# Patient Record
Sex: MALE | Race: ASIAN | NOT HISPANIC OR LATINO | Employment: OTHER | URBAN - METROPOLITAN AREA
[De-identification: names, ages, dates, MRNs, and addresses within clinical notes are randomized per-mention and may not be internally consistent; named-entity substitution may affect disease eponyms.]

---

## 2023-12-14 ENCOUNTER — TRANSFERRED RECORDS (OUTPATIENT)
Dept: HEALTH INFORMATION MANAGEMENT | Facility: CLINIC | Age: 48
End: 2023-12-14

## 2024-03-02 ENCOUNTER — APPOINTMENT (OUTPATIENT)
Dept: GENERAL RADIOLOGY | Facility: CLINIC | Age: 49
End: 2024-03-02
Attending: EMERGENCY MEDICINE
Payer: COMMERCIAL

## 2024-03-02 ENCOUNTER — HOSPITAL ENCOUNTER (INPATIENT)
Facility: CLINIC | Age: 49
LOS: 1 days | Discharge: HOME | End: 2024-03-05
Attending: EMERGENCY MEDICINE | Admitting: STUDENT IN AN ORGANIZED HEALTH CARE EDUCATION/TRAINING PROGRAM
Payer: COMMERCIAL

## 2024-03-02 ENCOUNTER — TRANSFERRED RECORDS (OUTPATIENT)
Dept: HEALTH INFORMATION MANAGEMENT | Facility: CLINIC | Age: 49
End: 2024-03-02

## 2024-03-02 DIAGNOSIS — I20.0 UNSTABLE ANGINA (H): ICD-10-CM

## 2024-03-02 DIAGNOSIS — R07.9 CHEST PAIN, UNSPECIFIED TYPE: ICD-10-CM

## 2024-03-02 DIAGNOSIS — I21.4 NSTEMI (NON-ST ELEVATED MYOCARDIAL INFARCTION) (H): Primary | ICD-10-CM

## 2024-03-02 LAB
ALBUMIN SERPL BCG-MCNC: 4.9 G/DL (ref 3.5–5.2)
ALP SERPL-CCNC: 111 U/L (ref 40–150)
ALT SERPL W P-5'-P-CCNC: 83 U/L (ref 0–70)
ANION GAP SERPL CALCULATED.3IONS-SCNC: 10 MMOL/L (ref 7–15)
AST SERPL W P-5'-P-CCNC: 46 U/L (ref 0–45)
BASOPHILS # BLD AUTO: 0.1 10E3/UL (ref 0–0.2)
BASOPHILS NFR BLD AUTO: 1 %
BILIRUB DIRECT SERPL-MCNC: <0.2 MG/DL (ref 0–0.3)
BILIRUB SERPL-MCNC: 0.5 MG/DL
BUN SERPL-MCNC: 11.6 MG/DL (ref 6–20)
CALCIUM SERPL-MCNC: 9.8 MG/DL (ref 8.6–10)
CHLORIDE SERPL-SCNC: 95 MMOL/L (ref 98–107)
CREAT SERPL-MCNC: 0.83 MG/DL (ref 0.67–1.17)
DEPRECATED HCO3 PLAS-SCNC: 30 MMOL/L (ref 22–29)
EGFRCR SERPLBLD CKD-EPI 2021: >90 ML/MIN/1.73M2
EOSINOPHIL # BLD AUTO: 0.2 10E3/UL (ref 0–0.7)
EOSINOPHIL NFR BLD AUTO: 2 %
ERYTHROCYTE [DISTWIDTH] IN BLOOD BY AUTOMATED COUNT: 11.4 % (ref 10–15)
GLUCOSE SERPL-MCNC: 129 MG/DL (ref 70–99)
HCT VFR BLD AUTO: 44.3 % (ref 40–53)
HGB BLD-MCNC: 15 G/DL (ref 13.3–17.7)
HOLD SPECIMEN: NORMAL
HOLD SPECIMEN: NORMAL
IMM GRANULOCYTES # BLD: 0 10E3/UL
IMM GRANULOCYTES NFR BLD: 0 %
LIPASE SERPL-CCNC: 40 U/L (ref 13–60)
LYMPHOCYTES # BLD AUTO: 2.4 10E3/UL (ref 0.8–5.3)
LYMPHOCYTES NFR BLD AUTO: 26 %
MCH RBC QN AUTO: 30.2 PG (ref 26.5–33)
MCHC RBC AUTO-ENTMCNC: 33.9 G/DL (ref 31.5–36.5)
MCV RBC AUTO: 89 FL (ref 78–100)
MONOCYTES # BLD AUTO: 0.6 10E3/UL (ref 0–1.3)
MONOCYTES NFR BLD AUTO: 6 %
NEUTROPHILS # BLD AUTO: 6.2 10E3/UL (ref 1.6–8.3)
NEUTROPHILS NFR BLD AUTO: 65 %
NRBC # BLD AUTO: 0 10E3/UL
NRBC BLD AUTO-RTO: 0 /100
PLATELET # BLD AUTO: 295 10E3/UL (ref 150–450)
POTASSIUM SERPL-SCNC: 3.9 MMOL/L (ref 3.4–5.3)
PROT SERPL-MCNC: 8 G/DL (ref 6.4–8.3)
RBC # BLD AUTO: 4.96 10E6/UL (ref 4.4–5.9)
SODIUM SERPL-SCNC: 135 MMOL/L (ref 135–145)
TROPONIN T SERPL HS-MCNC: 28 NG/L
TROPONIN T SERPL HS-MCNC: 35 NG/L
TROPONIN T SERPL HS-MCNC: 47 NG/L
WBC # BLD AUTO: 9.5 10E3/UL (ref 4–11)

## 2024-03-02 PROCEDURE — 80048 BASIC METABOLIC PNL TOTAL CA: CPT | Performed by: EMERGENCY MEDICINE

## 2024-03-02 PROCEDURE — 84484 ASSAY OF TROPONIN QUANT: CPT | Performed by: NURSE PRACTITIONER

## 2024-03-02 PROCEDURE — 84484 ASSAY OF TROPONIN QUANT: CPT | Performed by: EMERGENCY MEDICINE

## 2024-03-02 PROCEDURE — 80053 COMPREHEN METABOLIC PANEL: CPT | Performed by: EMERGENCY MEDICINE

## 2024-03-02 PROCEDURE — 99285 EMERGENCY DEPT VISIT HI MDM: CPT | Mod: 25

## 2024-03-02 PROCEDURE — 36415 COLL VENOUS BLD VENIPUNCTURE: CPT | Performed by: EMERGENCY MEDICINE

## 2024-03-02 PROCEDURE — 71046 X-RAY EXAM CHEST 2 VIEWS: CPT

## 2024-03-02 PROCEDURE — 250N000013 HC RX MED GY IP 250 OP 250 PS 637: Performed by: EMERGENCY MEDICINE

## 2024-03-02 PROCEDURE — G0378 HOSPITAL OBSERVATION PER HR: HCPCS

## 2024-03-02 PROCEDURE — 85025 COMPLETE CBC W/AUTO DIFF WBC: CPT | Performed by: EMERGENCY MEDICINE

## 2024-03-02 PROCEDURE — 82040 ASSAY OF SERUM ALBUMIN: CPT | Performed by: EMERGENCY MEDICINE

## 2024-03-02 PROCEDURE — 93005 ELECTROCARDIOGRAM TRACING: CPT

## 2024-03-02 PROCEDURE — 99222 1ST HOSP IP/OBS MODERATE 55: CPT | Performed by: NURSE PRACTITIONER

## 2024-03-02 PROCEDURE — 83690 ASSAY OF LIPASE: CPT | Performed by: EMERGENCY MEDICINE

## 2024-03-02 PROCEDURE — 36415 COLL VENOUS BLD VENIPUNCTURE: CPT | Performed by: NURSE PRACTITIONER

## 2024-03-02 PROCEDURE — 83036 HEMOGLOBIN GLYCOSYLATED A1C: CPT | Performed by: NURSE PRACTITIONER

## 2024-03-02 RX ORDER — ATORVASTATIN CALCIUM 40 MG/1
40 TABLET, FILM COATED ORAL DAILY
Status: DISCONTINUED | OUTPATIENT
Start: 2024-03-03 | End: 2024-03-05 | Stop reason: HOSPADM

## 2024-03-02 RX ORDER — NITROGLYCERIN 0.4 MG/1
0.4 TABLET SUBLINGUAL EVERY 5 MIN PRN
Status: DISCONTINUED | OUTPATIENT
Start: 2024-03-02 | End: 2024-03-05 | Stop reason: HOSPADM

## 2024-03-02 RX ORDER — ATORVASTATIN CALCIUM 40 MG/1
40 TABLET, FILM COATED ORAL DAILY
COMMUNITY

## 2024-03-02 RX ORDER — ASPIRIN 325 MG
325 TABLET ORAL ONCE
Status: COMPLETED | OUTPATIENT
Start: 2024-03-02 | End: 2024-03-02

## 2024-03-02 RX ORDER — LISINOPRIL AND HYDROCHLOROTHIAZIDE 20; 25 MG/1; MG/1
1 TABLET ORAL DAILY
Status: ON HOLD | COMMUNITY
End: 2024-03-05

## 2024-03-02 RX ORDER — NITROGLYCERIN 80 MG/1
1 PATCH TRANSDERMAL ONCE
Status: COMPLETED | OUTPATIENT
Start: 2024-03-02 | End: 2024-03-03

## 2024-03-02 RX ORDER — ASPIRIN 81 MG/1
162 TABLET, CHEWABLE ORAL ONCE
Status: COMPLETED | OUTPATIENT
Start: 2024-03-02 | End: 2024-03-02

## 2024-03-02 RX ORDER — ACETAMINOPHEN 325 MG/1
650 TABLET ORAL EVERY 4 HOURS PRN
Status: DISCONTINUED | OUTPATIENT
Start: 2024-03-02 | End: 2024-03-04

## 2024-03-02 RX ORDER — LISINOPRIL AND HYDROCHLOROTHIAZIDE 20; 25 MG/1; MG/1
1 TABLET ORAL DAILY
Status: DISCONTINUED | OUTPATIENT
Start: 2024-03-03 | End: 2024-03-03

## 2024-03-02 RX ORDER — ASPIRIN 81 MG/1
81 TABLET ORAL DAILY
COMMUNITY

## 2024-03-02 RX ORDER — MAGNESIUM HYDROXIDE/ALUMINUM HYDROXICE/SIMETHICONE 120; 1200; 1200 MG/30ML; MG/30ML; MG/30ML
30 SUSPENSION ORAL EVERY 4 HOURS PRN
Status: DISCONTINUED | OUTPATIENT
Start: 2024-03-02 | End: 2024-03-05 | Stop reason: HOSPADM

## 2024-03-02 RX ORDER — ASPIRIN 81 MG/1
81 TABLET ORAL DAILY
Status: DISCONTINUED | OUTPATIENT
Start: 2024-03-03 | End: 2024-03-04

## 2024-03-02 RX ORDER — MAGNESIUM HYDROXIDE/ALUMINUM HYDROXICE/SIMETHICONE 120; 1200; 1200 MG/30ML; MG/30ML; MG/30ML
30 SUSPENSION ORAL EVERY 4 HOURS PRN
Status: DISCONTINUED | OUTPATIENT
Start: 2024-03-02 | End: 2024-03-03

## 2024-03-02 RX ORDER — ASPIRIN 81 MG/1
81 TABLET ORAL DAILY
Status: DISCONTINUED | OUTPATIENT
Start: 2024-03-03 | End: 2024-03-02

## 2024-03-02 RX ORDER — ACETAMINOPHEN 650 MG/1
650 SUPPOSITORY RECTAL EVERY 4 HOURS PRN
Status: DISCONTINUED | OUTPATIENT
Start: 2024-03-02 | End: 2024-03-05 | Stop reason: HOSPADM

## 2024-03-02 RX ADMIN — ASPIRIN 325 MG ORAL TABLET 325 MG: 325 PILL ORAL at 18:44

## 2024-03-02 RX ADMIN — NITROGLYCERIN 1 PATCH: 0.4 PATCH TRANSDERMAL at 18:44

## 2024-03-02 ASSESSMENT — ACTIVITIES OF DAILY LIVING (ADL)
ADLS_ACUITY_SCORE: 31
ADLS_ACUITY_SCORE: 35

## 2024-03-02 ASSESSMENT — COLUMBIA-SUICIDE SEVERITY RATING SCALE - C-SSRS
6. HAVE YOU EVER DONE ANYTHING, STARTED TO DO ANYTHING, OR PREPARED TO DO ANYTHING TO END YOUR LIFE?: NO
1. IN THE PAST MONTH, HAVE YOU WISHED YOU WERE DEAD OR WISHED YOU COULD GO TO SLEEP AND NOT WAKE UP?: NO
2. HAVE YOU ACTUALLY HAD ANY THOUGHTS OF KILLING YOURSELF IN THE PAST MONTH?: NO

## 2024-03-02 NOTE — Clinical Note
The first balloon was inserted into the circumflex and middle circumflex.Max pressure = 14 murray. Total duration = 20 seconds.     Max pressure = 14 murray. Total duration = 20 seconds.    Balloon reinflated a second time: Max pressure = 14 murray. Total duration = 20 seconds.

## 2024-03-02 NOTE — ED TRIAGE NOTES
Midsternal CP for 50 min - pain radiating into both arms and jarw. Went to [facility] and sent here for inverted T waves.

## 2024-03-02 NOTE — Clinical Note
The first balloon was inserted into the circumflex and middle circumflex.Max pressure = 8 murray. Total duration = 15 seconds.

## 2024-03-03 ENCOUNTER — APPOINTMENT (OUTPATIENT)
Dept: CARDIOLOGY | Facility: CLINIC | Age: 49
End: 2024-03-03
Attending: NURSE PRACTITIONER
Payer: COMMERCIAL

## 2024-03-03 LAB
HBA1C MFR BLD: 6 %
LVEF ECHO: NORMAL
TROPONIN T SERPL HS-MCNC: 55 NG/L
TROPONIN T SERPL HS-MCNC: 69 NG/L
TSH SERPL DL<=0.005 MIU/L-ACNC: 2.1 UIU/ML (ref 0.3–4.2)
UFH PPP CHRO-ACNC: 0.18 IU/ML
UFH PPP CHRO-ACNC: 0.4 IU/ML
UFH PPP CHRO-ACNC: 0.45 IU/ML

## 2024-03-03 PROCEDURE — 99255 IP/OBS CONSLTJ NEW/EST HI 80: CPT | Performed by: INTERNAL MEDICINE

## 2024-03-03 PROCEDURE — 84484 ASSAY OF TROPONIN QUANT: CPT | Performed by: NURSE PRACTITIONER

## 2024-03-03 PROCEDURE — 85520 HEPARIN ASSAY: CPT | Performed by: NURSE PRACTITIONER

## 2024-03-03 PROCEDURE — 96365 THER/PROPH/DIAG IV INF INIT: CPT

## 2024-03-03 PROCEDURE — 250N000013 HC RX MED GY IP 250 OP 250 PS 637: Performed by: NURSE PRACTITIONER

## 2024-03-03 PROCEDURE — 85520 HEPARIN ASSAY: CPT | Performed by: STUDENT IN AN ORGANIZED HEALTH CARE EDUCATION/TRAINING PROGRAM

## 2024-03-03 PROCEDURE — G0378 HOSPITAL OBSERVATION PER HR: HCPCS

## 2024-03-03 PROCEDURE — 93010 ELECTROCARDIOGRAM REPORT: CPT | Performed by: INTERNAL MEDICINE

## 2024-03-03 PROCEDURE — 36415 COLL VENOUS BLD VENIPUNCTURE: CPT | Performed by: STUDENT IN AN ORGANIZED HEALTH CARE EDUCATION/TRAINING PROGRAM

## 2024-03-03 PROCEDURE — 255N000002 HC RX 255 OP 636: Performed by: STUDENT IN AN ORGANIZED HEALTH CARE EDUCATION/TRAINING PROGRAM

## 2024-03-03 PROCEDURE — 36415 COLL VENOUS BLD VENIPUNCTURE: CPT | Performed by: HOSPITALIST

## 2024-03-03 PROCEDURE — 84484 ASSAY OF TROPONIN QUANT: CPT | Performed by: HOSPITALIST

## 2024-03-03 PROCEDURE — C8929 TTE W OR WO FOL WCON,DOPPLER: HCPCS

## 2024-03-03 PROCEDURE — 93306 TTE W/DOPPLER COMPLETE: CPT | Mod: 26 | Performed by: INTERNAL MEDICINE

## 2024-03-03 PROCEDURE — 36415 COLL VENOUS BLD VENIPUNCTURE: CPT | Performed by: NURSE PRACTITIONER

## 2024-03-03 PROCEDURE — 99233 SBSQ HOSP IP/OBS HIGH 50: CPT | Performed by: NURSE PRACTITIONER

## 2024-03-03 PROCEDURE — 93005 ELECTROCARDIOGRAM TRACING: CPT

## 2024-03-03 PROCEDURE — 96366 THER/PROPH/DIAG IV INF ADDON: CPT

## 2024-03-03 PROCEDURE — 250N000013 HC RX MED GY IP 250 OP 250 PS 637: Performed by: INTERNAL MEDICINE

## 2024-03-03 PROCEDURE — 84443 ASSAY THYROID STIM HORMONE: CPT | Performed by: NURSE PRACTITIONER

## 2024-03-03 PROCEDURE — 250N000011 HC RX IP 250 OP 636: Performed by: HOSPITALIST

## 2024-03-03 RX ORDER — NALOXONE HYDROCHLORIDE 0.4 MG/ML
0.4 INJECTION, SOLUTION INTRAMUSCULAR; INTRAVENOUS; SUBCUTANEOUS
Status: DISCONTINUED | OUTPATIENT
Start: 2024-03-03 | End: 2024-03-05 | Stop reason: HOSPADM

## 2024-03-03 RX ORDER — LISINOPRIL 20 MG/1
20 TABLET ORAL DAILY
Status: DISCONTINUED | OUTPATIENT
Start: 2024-03-04 | End: 2024-03-04

## 2024-03-03 RX ORDER — NALOXONE HYDROCHLORIDE 0.4 MG/ML
0.2 INJECTION, SOLUTION INTRAMUSCULAR; INTRAVENOUS; SUBCUTANEOUS
Status: DISCONTINUED | OUTPATIENT
Start: 2024-03-03 | End: 2024-03-05 | Stop reason: HOSPADM

## 2024-03-03 RX ORDER — PANTOPRAZOLE SODIUM 40 MG/1
40 TABLET, DELAYED RELEASE ORAL
Status: DISCONTINUED | OUTPATIENT
Start: 2024-03-03 | End: 2024-03-05 | Stop reason: HOSPADM

## 2024-03-03 RX ORDER — MORPHINE SULFATE 2 MG/ML
2 INJECTION, SOLUTION INTRAMUSCULAR; INTRAVENOUS
Status: DISCONTINUED | OUTPATIENT
Start: 2024-03-03 | End: 2024-03-05 | Stop reason: HOSPADM

## 2024-03-03 RX ORDER — HEPARIN SODIUM 10000 [USP'U]/100ML
0-5000 INJECTION, SOLUTION INTRAVENOUS CONTINUOUS
Status: DISCONTINUED | OUTPATIENT
Start: 2024-03-03 | End: 2024-03-04

## 2024-03-03 RX ADMIN — HUMAN ALBUMIN MICROSPHERES AND PERFLUTREN 4 ML: 10; .22 INJECTION, SOLUTION INTRAVENOUS at 14:45

## 2024-03-03 RX ADMIN — ATORVASTATIN CALCIUM 40 MG: 40 TABLET, FILM COATED ORAL at 11:33

## 2024-03-03 RX ADMIN — HEPARIN SODIUM 950 UNITS/HR: 10000 INJECTION, SOLUTION INTRAVENOUS at 02:06

## 2024-03-03 RX ADMIN — PANTOPRAZOLE SODIUM 40 MG: 40 TABLET, DELAYED RELEASE ORAL at 12:28

## 2024-03-03 RX ADMIN — Medication 10 MG: at 23:16

## 2024-03-03 RX ADMIN — LISINOPRIL AND HYDROCHLOROTHIAZIDE 1 TABLET: 25; 20 TABLET ORAL at 08:28

## 2024-03-03 RX ADMIN — HEPARIN SODIUM 1110 UNITS/HR: 10000 INJECTION, SOLUTION INTRAVENOUS at 18:16

## 2024-03-03 RX ADMIN — ASPIRIN 81 MG: 81 TABLET ORAL at 08:28

## 2024-03-03 RX ADMIN — ACETAMINOPHEN 650 MG: 325 TABLET, FILM COATED ORAL at 02:36

## 2024-03-03 ASSESSMENT — ACTIVITIES OF DAILY LIVING (ADL)
ADLS_ACUITY_SCORE: 31

## 2024-03-03 NOTE — PLAN OF CARE
PRIMARY DIAGNOSIS: CHEST PAIN  OUTPATIENT/OBSERVATION GOALS TO BE MET BEFORE DISCHARGE:  1. Ruled out acute coronary syndrome (negative or stable Troponin):  No  2. Pain Status: Improved-controlled with oral pain medications.  3. Appropriate provocative testing performed: No  - Interpretation of cardiac rhythm per telemetry tech: SR     4. Cleared by Consultants (if applicable):No  5. Return to near baseline physical activity: Yes  Discharge Planner Nurse   Safe discharge environment identified: Yes  Barriers to discharge: Yes       Entered by: Fanny Morrow RN 03/03/2024 2:44 AM     Please review provider order for any additional goals.   Nurse to notify provider when observation goals have been met and patient is ready for discharge.  Vitals are Temp: 98.3  F (36.8  C) Temp src: Oral BP: 104/64 Pulse: 89   Resp: 16 SpO2: 95 %.  Patient is Alert and Oriented x4. They are independent with no assistive devices .  Pt is a NPO diet.  They are complaining of 4/10 headache pain.  Tylenol given for pain.  Patient has Heparin running at 950 units per hour. Denies nausea/dizziness/SOB. Denies chest pain. Nitroglycerin patch to L chest. Trending troponin. Plan is for cardiology consult.

## 2024-03-03 NOTE — CONSULTS
[facility]    Cardiology Consultation     Date of Admission:  3/2/2024    Assessment & Plan   Huyen Hickman is a 48 year old male who was admitted on 3/2/2024.    Impression:  1.  Non-ST elevation myocardial infarct.  The character of the chest discomfort sounds ischemic and the rise in troponin is suggestive of a non-ST elevation myocardial infarct in a patient with a particularly malignant family history of coronary artery disease and who also has a history of hyperlipidemia and hypertension.  2.  Essential hypertension.  Blood pressure appears to be well-controlled here.  Blood pressure however is on the lower side precluding the use of beta-blockers would like to use at this time.    Plan:  1.  Given the history which is highly suggestive of angina pectoris and given his extremely strong family history of coronary artery disease and the troponin rise coronary angiography is indicated in this patient.  I explained the risks and benefits of the procedure to the patient to the risk of stroke, heart attack, death, bleeding, bruising, hematoma formation, vascular damage, limb loss, dye allergy, dye nephropathy, the risk associated with conscious sedation including aspiration and intubation, and the risk associated with blood transfusions.  I also explained the special risks associated with coronary interventions including increased risk of death, myocardial infarction and emergency bypass surgery.  The patient told me he understood why redoing the procedure.  He told me as to how the procedure is performed..  He told me that he understood the risks associate with the procedure and finally he told me that he wished to proceed.  2.  I will stop the hydrochlorothiazide portion of the lisinopril hydrochlorothiazide so that we can try and introduce beta-blockers tomorrow.  His blood pressure is too low today.  3.  We will continue the aspirin, intravenous heparin and atorvastatin 40  mg/day  4.  I will also check an LP(a) since there is a significantly higher incidence of this lipid abnormality in septations.  It is particular important given his family history.  High complexity     Jomar Oliva MD, MD, MD    Primary Care Physician   [facility] [place] [facility]    Reason for Consult   Reason for consult: I was asked by hospitalist service to evaluate this patient for non-ST elevation myocardial infarct and chest discomfort..    History of Present Illness   Huyen Hickman is a 48 year old male who presents with chest discomfort.  On Friday at 6 PM which is the day before yesterday he had come home from work.  He developed retrosternal chest discomfort which he described as a pressure feeling in the chest with radiation down both arms and up into his jaw.  He took an aspirin and an Aleve and the discomfort went away after about 10 minutes.  The following day he had numerous episodes of discomfort in his chest.  He went to a [place] restaurant in [place].  He developed a discomfort in his chest similar to the discomfort that he had the previous day which lasted for maybe 5 minutes.  He had about 4 episodes of this discomfort traveling from [place] back to [place].  After the fourth episode he called the nurse line at his primary care physician's office and they told him to call 911.  But because he is only 10 minutes away from an urgent care he went there.  They performed an EKG which showed T wave inversions per history in some leads but the EKG is not available to us.  They then transferred the patient to the emergency room here.  The twelve-lead electrocardiogram here did not show T wave inversions.  However his troponins were already raised first 1 being 28, the second 35, the third 47 and the fourth 55.  The patient has had no further chest discomfort here while placed on aspirin and intravenous heparin.    This patient has a particularly malignant  family history of coronary artery disease.  His father is 1 of 5 brothers.  His father and every single uncle either had heart attacks or had stents placed.  His brother at the age of 49 also had stents placed.  His father  of a myocardial infarct and I believe to of his uncles  of myocardial infarction.  All of the uncles and his father had a heart attacks either in the 40s or early 50s.  The patient does have a history of hyperlipidemia for which she is treated with atorvastatin and he also has a history of essential hypertension.  He smoked a small amount intermittently many years ago in his teens.  He is not a diabetic.    Past Medical History     Hypercholesterolemia.  Essential hypertension.  Acid reflux    Past Surgical History     No past surgical history    Prior to Admission Medications   Prior to Admission Medications   Prescriptions Last Dose Informant Patient Reported? Taking?   aspirin 81 MG EC tablet 3/2/2024 at PM Self Yes Yes   Sig: Take 81 mg by mouth daily   atorvastatin (LIPITOR) 40 MG tablet 3/2/2024 at 1200 Self Yes Yes   Sig: Take 40 mg by mouth daily   lisinopril-hydrochlorothiazide (ZESTORETIC) 20-25 MG tablet 3/2/2024 at AM Self Yes Yes   Sig: Take 1 tablet by mouth daily      Facility-Administered Medications: None     Current Facility-Administered Medications   Medication Dose Route Frequency    aspirin  81 mg Oral Daily    atorvastatin  40 mg Oral Daily    lisinopril-hydrochlorothiazide  1 tablet Oral Daily     Current Facility-Administered Medications   Medication Last Rate    heparin 950 Units/hr (24 0206)     Allergies   No Known Allergies    Social History      Does not smoke cigarettes.  .    Family History     Very strong family history of coronary artery disease with his father and 4 uncles and a brother all having had myocardial infarcts or stents or death due to myocardial infarction with onset of disease in the 40s to early 50s.       Review of Systems   A  "comprehensive review of system was performed and is negative other than that noted in the HPI or here.     Physical Exam   Vital Signs with Ranges  Temp:  [97.9  F (36.6  C)-98.4  F (36.9  C)] 98.4  F (36.9  C)  Pulse:  [] 77  Resp:  [12-19] 16  BP: ()/() 111/70  SpO2:  [95 %-100 %] 97 %  Wt Readings from Last 4 Encounters:   03/02/24 80.2 kg (176 lb 11.2 oz)   05/03/15 75.6 kg (166 lb 11.2 oz)     No intake/output data recorded.      Vitals: /70 (BP Location: Left arm)   Pulse 77   Temp 98.4  F (36.9  C) (Oral)   Resp 16   Ht 1.676 m (5' 6\")   Wt 80.2 kg (176 lb 11.2 oz)   SpO2 97%   BMI 28.52 kg/m      Physical Exam:   General - Alert and oriented to time place and person in no acute distress  Eyes - No scleral icterus  HEENT - Neck supple, moist mucous membranes  Cardiovascular -heart sounds 1 and 2 are normal.  No murmurs.  Jugular venous pulse is not raised carotids are normal with no bruits.  Extremities - There is no peripheral edema  Respiratory -chest is clear to percussion and auscultation with no added sounds and symmetrical expansion of the chest  Skin - No pallor or cyanosis  Gastrointestinal - Non tender and non distended without rebound or guarding  Psych - Appropriate affect   Neurological - No gross motor neurological focal deficits    No lab results found in last 7 days.    Invalid input(s): \"TROPONINIES\"    Recent Labs   Lab 03/02/24  1647   WBC 9.5   HGB 15.0   MCV 89         POTASSIUM 3.9   CHLORIDE 95*   CO2 30*   BUN 11.6   CR 0.83   GFRESTIMATED >90   ANIONGAP 10   ASHLEY 9.8   *   ALBUMIN 4.9   PROTTOTAL 8.0   BILITOTAL 0.5   ALKPHOS 111   ALT 83*   AST 46*   LIPASE 40     No results for input(s): \"CHOL\", \"HDL\", \"LDL\", \"TRIG\", \"CHOLHDLRATIO\" in the last 15667 hours.  Recent Labs   Lab 03/02/24  1647   WBC 9.5   HGB 15.0   HCT 44.3   MCV 89        No results for input(s): \"PH\", \"PHV\", \"PO2\", \"PO2V\", \"SAT\", \"PCO2\", \"PCO2V\", \"HCO3\", \"HCO3V\" " "in the last 168 hours.  No results for input(s): \"NTBNPI\", \"NTBNP\" in the last 168 hours.  No results for input(s): \"DD\" in the last 168 hours.  No results for input(s): \"SED\", \"CRP\" in the last 168 hours.  Recent Labs   Lab 03/02/24  1647        No results for input(s): \"TSH\" in the last 168 hours.  No results for input(s): \"COLOR\", \"APPEARANCE\", \"URINEGLC\", \"URINEBILI\", \"URINEKETONE\", \"SG\", \"UBLD\", \"URINEPH\", \"PROTEIN\", \"UROBILINOGEN\", \"NITRITE\", \"LEUKEST\", \"RBCU\", \"WBCU\" in the last 168 hours.    Imaging:  Recent Results (from the past 48 hour(s))   Chest XR,  PA & LAT    Narrative    EXAM: XR CHEST 2 VIEWS  LOCATION: [facility]  DATE: 3/2/2024    INDICATION: chest pain  COMPARISON: None.      Impression    IMPRESSION: Negative chest.       Echo:  No results found for this or any previous visit (from the past 4320 hour(s)).    Clinically Significant Risk Factors Present on Admission                # Drug Induced Platelet Defect: home medication list includes an antiplatelet medication   # Hypertension: Home medication list includes antihypertensive(s)      # Overweight: Estimated body mass index is 28.52 kg/m  as calculated from the following:    Height as of this encounter: 1.676 m (5' 6\").    Weight as of this encounter: 80.2 kg (176 lb 11.2 oz).                                                                 "

## 2024-03-03 NOTE — ED NOTES
[facility]  ED Nurse Handoff Report    ED Chief complaint: Chest Pain  . ED Diagnosis:   Final diagnoses:   Chest pain, unspecified type       Allergies: No Known Allergies    Code Status: Full Code    Activity level - Baseline/Home:  independent.  Activity Level - Current:   independent.   Lift room needed: No.   Bariatric: No   Needed: No   Isolation: No.   Infection: Not Applicable.     Respiratory status: Room air    Vital Signs (within 30 minutes):   Vitals:    03/02/24 1800 03/02/24 1815 03/02/24 1845 03/02/24 1900   BP: 125/74 139/77 116/82 122/82   Pulse: 92   85   Resp: 12   16   Temp:       TempSrc:       SpO2: 97%   97%   Weight:       Height:           Cardiac Rhythm:  ,      Pain level:  Denies  Patient confused: No.   Patient Falls Risk: patient and family education.   Elimination Status: Has voided     Patient Report - Initial Complaint: CP, jaw pain, arm pain  Focused Assessment:  Huyen Hickman is a 48 y.o. male who presents for evaluation of chest pain that radiates up into his left jaw and both arms. Pt had episode of pain yesterday, which was relieved with Aleve. Pain started again this morning, thought initially heartburn, but it continued so he presented for evaluation. He did take aspirin. Denies SOB or other symptoms. VSS.    Abnormal Results:   Labs Ordered and Resulted from Time of ED Arrival to Time of ED Departure   BASIC METABOLIC PANEL - Abnormal       Result Value    Sodium 135      Potassium 3.9      Chloride 95 (*)     Carbon Dioxide (CO2) 30 (*)     Anion Gap 10      Urea Nitrogen 11.6      Creatinine 0.83      GFR Estimate >90      Calcium 9.8      Glucose 129 (*)    TROPONIN T, HIGH SENSITIVITY - Abnormal    Troponin T, High Sensitivity 28 (*)    TROPONIN T, HIGH SENSITIVITY - Abnormal    Troponin T, High Sensitivity 35 (*)    HEPATIC FUNCTION PANEL - Abnormal    Protein Total 8.0      Albumin 4.9      Bilirubin Total 0.5      Alkaline Phosphatase  111      AST 46 (*)     ALT 83 (*)     Bilirubin Direct <0.20     LIPASE - Normal    Lipase 40     CBC WITH PLATELETS AND DIFFERENTIAL    WBC Count 9.5      RBC Count 4.96      Hemoglobin 15.0      Hematocrit 44.3      MCV 89      MCH 30.2      MCHC 33.9      RDW 11.4      Platelet Count 295      % Neutrophils 65      % Lymphocytes 26      % Monocytes 6      % Eosinophils 2      % Basophils 1      % Immature Granulocytes 0      NRBCs per 100 WBC 0      Absolute Neutrophils 6.2      Absolute Lymphocytes 2.4      Absolute Monocytes 0.6      Absolute Eosinophils 0.2      Absolute Basophils 0.1      Absolute Immature Granulocytes 0.0      Absolute NRBCs 0.0          Chest XR,  PA & LAT   Final Result   IMPRESSION: Negative chest.          Treatments provided: Labs, EKG, CXR, aspirin, nitroglycerin patch  Family Comments: N/A  OBS brochure/video discussed/provided to patient:  Yes  ED Medications:   Medications   nitroGLYcerin (NITRODUR) 0.4 MG/HR 24 hr patch 1 patch (1 patch Transdermal $Patch/Med Applied 3/2/24 1844)   aspirin (ASA) tablet 325 mg (325 mg Oral $Given 3/2/24 1844)       Drips infusing:  No  For the majority of the shift this patient was Green.   Interventions performed were N/A.    Sepsis treatment initiated: No    Cares/treatment/interventions/medications to be completed following ED care: per hospitalist    ED Nurse Name: Yue Florez RN  7:46 PM    RECEIVING UNIT ED HANDOFF REVIEW    Above ED Nurse Handoff Report was reviewed: Yes  Reviewed by: Fanny Morrow RN on March 2, 2024 at 9:57 PM   GONZÁLEZ Navarro called the ED to inform them the note was read: Yes

## 2024-03-03 NOTE — PLAN OF CARE
PRIMARY DIAGNOSIS: CHEST PAIN  OUTPATIENT/OBSERVATION GOALS TO BE MET BEFORE DISCHARGE:  1. Ruled out acute coronary syndrome (negative or stable Troponin):  No  2. Pain Status: Improved-controlled with oral pain medications.  3. Appropriate provocative testing performed: No  - Interpretation of cardiac rhythm per telemetry tech: SR     4. Cleared by Consultants (if applicable):No  5. Return to near baseline physical activity: Yes  Discharge Planner Nurse   Safe discharge environment identified: Yes  Barriers to discharge: Yes       Entered by: Fanny Morrow RN 03/03/2024 4:25 AM     Please review provider order for any additional goals.   Nurse to notify provider when observation goals have been met and patient is ready for discharge.  Vitals are Temp: 98.3  F (36.8  C) Temp src: Oral BP: 99/56 Pulse: 76   Resp: 18 SpO2: 96 %.  Patient is Alert and Oriented x4. They are independent with no assistive devices .  Pt is a NPO diet.  Patient has Heparin running at 950 units per hour. Denies nausea/dizziness/SOB. Denies chest pain. Nitroglycerin patch to L chest. Trending troponin. Plan is for cardiology consult.

## 2024-03-03 NOTE — PROGRESS NOTES
[facility]    Medicine Progress Note - Hospitalist Service    Date of Admission:  3/2/2024    Assessment & Plan   Huyen Hickman is a 48 year old male with PMH significant for hypertension, HLD, GERD, and elevated coronary calcium admitted on 3/2/2024 for radiating chest pain and elevated troponin, found to have a NSTEMI.      Acute medical issues:  #NSTEMI.  Likely ischemic with significant past family history of CAD and early cardiac death.  EKG changes per report -- ST changes in the inferior leads.  Flipped T-wave in lead III and aVF.  Primarily followed by the  system -- Dr. Estrada  -- Admit to Medicine  -- Heparin gtt.    -- Appreciate cardiology assistance.  Planning for angiogram in the AM. NPO at MN.   -- Echo today.  Continue tele monitoring.  Repeat EKG does not demonstrate TWI.  Troponins mildly elevated at 69.  Can stop trendng (69 has been the highet) as he is going for a cath in the AM.    -- Checking LP(a). Continue ASA, and atorvastatin 40 mg daily.   -- Continue lisinopril but discontinue hydrochlorothiazide.  BP a bit soft today so will hold initiation of BB today.  Start metoprolol tartrate 12. 5 mg BID with hold parameters (SBP < 100 or HR < 60).  -- PRN NTG SL, Morphine IV 2 mg q2hrs PRN CP.      Chronic medical issues:  #HTN:  As above.  Discontinue hydrochlorothiazide.  Continue lisinopril and start metoprolol in the AM.   #Metabolic syndrome // Prediabetic: A1 5.7.Check FLP in the AM.  Repeat A1C.  Continue atorvastatin 40 mg.   #Strong family history of cardiac disease: Premature CAD in the last 3 generations, almost every male in his life  in their mid 50s.  The patient's brother had a MI recently requiring PCI at age 49.   #GERD: Previous history of GERD.  Uses TUMS or PPI PRN.  Start PPI.  Will resume PPI while on heparin/ASA here.           Observation Goals: List all goals to be met before discharge home: , - Serial troponins and stress test  "complete., - Seen and cleared by consultant if applicable, - Adequate pain control on oral analgesia, - Vital signs normal or at patient baseline, - Safe disposition plan has been identified, - Nurse to notify provider when observation goals have been met and patient is ready for discharge.  Diet: Low Saturated Fat Na <2400 mg    DVT Prophylaxis: heparin gtt for now.   Shipman Catheter: Not present  Lines: None     Cardiac Monitoring: ACTIVE order. Indication: Chest pain/ ACS rule out (24 hours)  Code Status: Full Code      Clinically Significant Risk Factors Present on Admission                # Drug Induced Platelet Defect: home medication list includes an antiplatelet medication   # Hypertension: Home medication list includes antihypertensive(s)      # Overweight: Estimated body mass index is 28.52 kg/m  as calculated from the following:    Height as of this encounter: 1.676 m (5' 6\").    Weight as of this encounter: 80.2 kg (176 lb 11.2 oz).              Disposition Plan      Expected Discharge Date: 03/03/2024                  The patient's care was discussed with the Bedside Nurse, Care Coordinator/, Patient, and Cardiology Consultant(s).    FRANCINE Rojas [facility]  Hospitalist Service  [facility]  Securely message with [employer] (more info)  Text page via [email or web address] Paging/Directory   ______________________________________________________________________    Interval History   Assumed care of patient. VSS.    Intermittent chest pain.    No SOB.    Planning for angiogram in the AM.      Physical Exam   Vital Signs: Temp: 98.4  F (36.9  C) Temp src: Oral BP: 118/82 Pulse: 88   Resp: 16 SpO2: 94 % O2 Device: None (Room air)    Weight: 176 lbs 11.2 oz    GEN:   Alert, oriented x 3, appears comfortable, NAD.  NECK:   Supple ,no mass or thyromegaly   HEENT:  Normocephalic/atraumatic, no scleral icterus, no nasal discharge, mouth moist.  CV:   Regular rate and rhythm, no murmur or JVD.  " S1 + S2 noted, no S3 or S4. Tele SR without dynamic changes.   LUNGS:   Clear to auscultation bilaterally without rales/rhonchi/wheezing/retractions.  Symmetric chest rise on inhalation noted.  ABD:   Active bowel sounds, soft, non-tender/non-distended.  No rebound/guarding/rigidity.  EXT:   No edema.  No cyanosis.  No joint synovitis noted.  SKIN:   Dry to touch, no exanthems noted in the visualized areas.  Neurologic: Grossly intact,non focal.   Neuropsychiatric:  General: normal, calm and normal eye contact  Level of consciousness: alert / normal  Affect: normal  Orientation: oriented to self, place, time and situation     Medical Decision Making       45 MINUTES SPENT BY ME on the date of service doing chart review, history, exam, documentation & further activities per the note.      Data   ------------------------- PAST 24 HR DATA REVIEWED -----------------------------------------------    I have personally reviewed the following data over the past 24 hrs:    9.5  \   15.0   / 295     135 95 (L) 11.6 /  129 (H)   3.9 30 (H) 0.83 \     ALT: 83 (H) AST: 46 (H) AP: 111 TBILI: 0.5   ALB: 4.9 TOT PROTEIN: 8.0 LIPASE: 40     Trop: 69 (H) BNP: N/A       Imaging results reviewed over the past 24 hrs:   Recent Results (from the past 24 hour(s))   Chest XR,  PA & LAT    Narrative    EXAM: XR CHEST 2 VIEWS  LOCATION: [facility]  DATE: 3/2/2024    INDICATION: chest pain  COMPARISON: None.      Impression    IMPRESSION: Negative chest.

## 2024-03-03 NOTE — PHARMACY-ADMISSION MEDICATION HISTORY
Pharmacist Admission Medication History    Admission medication history is complete. The information provided in this note is only as accurate as the sources available at the time of the update.    Information Source(s): Patient via in-person    Pertinent Information: None    Changes made to PTA medication list:  Added: all  Deleted: None  Changed: None    Allergies reviewed with patient and updates made in EHR: yes    Medication History Completed By: Crispin Tyson [facility] 3/2/2024 7:43 PM    PTA Med List   Medication Sig Last Dose    aspirin 81 MG EC tablet Take 81 mg by mouth daily 3/2/2024 at PM    atorvastatin (LIPITOR) 40 MG tablet Take 40 mg by mouth daily 3/2/2024 at 1200    lisinopril-hydrochlorothiazide (ZESTORETIC) 20-25 MG tablet Take 1 tablet by mouth daily 3/2/2024 at AM

## 2024-03-03 NOTE — PLAN OF CARE
PRIMARY DIAGNOSIS: CHEST PAIN  OUTPATIENT/OBSERVATION GOALS TO BE MET BEFORE DISCHARGE:  1. Ruled out acute coronary syndrome (negative or stable Troponin):  Yes  2. Pain Status: Pain free.  3. Appropriate provocative testing performed: No  - Stress Test Procedure: Angiogram tomorrow   - Interpretation of cardiac rhythm per telemetry tech: SR    4. Cleared by Consultants (if applicable):No  5. Return to near baseline physical activity: Yes  Discharge Planner Nurse   Safe discharge environment identified: Yes  Barriers to discharge: Yes       Entered by: CASSI RAMIREZ RN 03/03/2024    Vital signs:  Temp: 98.4  F (36.9  C) Temp src: Oral BP: 111/70 Pulse: 77   Resp: 16 SpO2: 97 % O2 Device: None (Room air)   Alert and oriented x4. Denies chest pain, SOB, nausea, vomiting. On Heparin gtt 950Units/hr. Trops continue trending up. Cardiology following. Plan for Angiogram tomorrow. On cardiac diet. NPO after midnight. Up independently. Plan for continue on Heparin gtt and cardiac monitor. Call light in reach.        Please review provider order for any additional goals.   Nurse to notify provider when observation goals have been met and patient is ready for discharge.

## 2024-03-03 NOTE — PLAN OF CARE
PRIMARY DIAGNOSIS: CHEST PAIN  OUTPATIENT/OBSERVATION GOALS TO BE MET BEFORE DISCHARGE:  1. Ruled out acute coronary syndrome (negative or stable Troponin):  Yes  2. Pain Status: improved with out intervention   3. Appropriate provocative testing performed: No  - Stress Test Procedure: Angiogram tomorrow, Echo today  - Interpretation of cardiac rhythm per telemetry tech: SR/ST/HR 90s    4. Cleared by Consultants (if applicable):No  5. Return to near baseline physical activity: Yes  Discharge Planner Nurse   Safe discharge environment identified: Yes  Barriers to discharge: Yes       Entered by: CASSI RAMIREZ RN 03/03/2024 12:36 PM   Vital signs:  Temp: 98.4  F (36.9  C) Temp src: Oral BP: 118/82 Pulse: 88   Resp: 16 SpO2: 94 % O2 Device: None (Room air)   Alert and oriented x4. Had a brief episode of chest pain associated with right arm cramp and left sided headache lasted for less than 5 minutes after breakfast and walk. Resolved with rest without intervention. No changes on tele monitor and vitals. EKG NSR. Provider aware. Echo ordered. Started on PPI PO. On Heparin gtt 950units/hr. On cardiac diet. NPO after midnight for Angio tomorrow. Cardiology following. Reminded pt to call if chest pain returns. Significant other at bedside. Call light in reach.       Please review provider order for any additional goals.   Nurse to notify provider when observation goals have been met and patient is ready for discharge.

## 2024-03-03 NOTE — PLAN OF CARE
PRIMARY DIAGNOSIS: CHEST PAIN  OUTPATIENT/OBSERVATION GOALS TO BE MET BEFORE DISCHARGE:  1. Ruled out acute coronary syndrome (negative or stable Troponin):  Yes  2. Pain Status: Pain free.  3. Appropriate provocative testing performed: Yes  - Stress Test Procedure: Echo done. Angiogram in the morning.  - Interpretation of cardiac rhythm per telemetry tech: ST/HR 100s    4. Cleared by Consultants (if applicable):No  5. Return to near baseline physical activity: Yes  Discharge Planner Nurse   Safe discharge environment identified: Yes  Barriers to discharge: Yes       Entered by: CASSI RAMIREZ RN 03/03/2024    Vital signs:  Temp: 97.6  F (36.4  C) Temp src: Oral BP: 111/71 Pulse: 91   Resp: 16 SpO2: 96 % O2 Device: None (Room air)   Alert and oriented x4. Had a brief episode of chest pain associated with right arm cramp and left sided headache lasted for less than 5 minutes after breakfast and walk. Resolved with rest without intervention. No changes on tele monitor and vitals. EKG NSR. Provider aware. Echo ordered and completed. Started on PPI PO. On Heparin gtt increased to 1100units/hr after a bolus of 2400 Units. . On cardiac diet. NPO after midnight for Angio tomorrow. Cardiology following. Reminded pt to call if chest pain returns. Significant other at bedside. Call light in reach.        Please review provider order for any additional goals.   Nurse to notify provider when observation goals have been met and patient is ready for discharge.

## 2024-03-03 NOTE — PROGRESS NOTES
XC    Notified from Dr. Tolentino about an up trending troponin with a story concerning for anginal chest pain.  Will start IV heparin and continue to trend serial troponin levels.  Cancel stress echocardiogram and request cardiology consultation for consideration of coronary angiogram.    Marcellus Figueroa,   March 3, 2024

## 2024-03-03 NOTE — H&P
[facility]    History and Physical - Hospitalist Service       Date of Admission:  3/2/2024    Assessment & Plan      Huyen Hickman is a 48 year old male with PMH significant for hypertension, HLD, GERD, and elevated coronary calcium admitted on 3/2/2024 for radiating chest pain    ED workup notable for an initial troponin of 28 and repeat troponin of 35.  Per report of the ED provider, patient had presented to [facility] urgent care and was noted to have inverted T waves which prompted them to send him over to the emergency department.  Unfortunately they did not send a copy of that EKG with the patient.  The remainder of the patient's labs were unremarkable.      # Radiating chest pain  # Elevated troponin  # Elevated coronary calcium score  Suspect acute coronary syndrome given elevated troponin. Patient follows with a preventative cardiologist due to significant family history of early onset coronary artery disease.  Patient has elevated coronary artery calcium score of 110. Troponin in the ED was elevated at 28 and repeat was elevated to 35.  Nitroglycerine patch placed in the ED.  -Admit to observation  -Cardiology consult  -Trend troponin  -Will consider starting heparin drip if chest pain returns.  -Continue PTA aspirin and statin.  -Stress test was ordered, will defer to cardiology whether this can be done on an outpatient basis or whether they prefer angiogram given risk factors.  -Continue nitroglycerin patch started in the ED.      # GERD  Patient does have a significant history of GERD, however, these episodes were somewhat different and he has elevated troponins.  Suspect    # Hypertension  -Continue PTA lisinopril-HCTZ    # Hyperlipidemia  -Continue PTA Statin and Aspirin          Diet:  Regular  DVT Prophylaxis: Low Risk/Ambulatory with no VTE prophylaxis indicated  Shipman Catheter: Not present  Lines: None     Cardiac Monitoring: None  Code Status:   "Full    Clinically Significant Risk Factors Present on Admission                       # Overweight: Estimated body mass index is 29.18 kg/m  as calculated from the following:    Height as of this encounter: 1.676 m (5' 6\").    Weight as of this encounter: 82 kg (180 lb 12.4 oz).              Disposition Plan      Expected Discharge Date: 03/03/2024                The patient's care was discussed with the Attending Physician, Dr. Mohan and Patient.    FRANCINE Ramirez [facility]  Hospitalist Service  [facility]  Securely message with Vinny (more info)  Text page via [facility] Paging/Directory     ______________________________________________________________________    Chief Complaint   Radiating Chest Pain    History is obtained from the patient    History of Present Illness   Huyen Hickman is a 48 year old male with PMH significant for hypertension, HLD, GERD, and elevated coronary calcium admitted on 3/2/2024 for radiating chest pain.  First episode occurred yesterday while eating lunch and lasted approximately 10 minutes.  Reports the pain radiated to both arms and his jaw.  Also felt pain in his head and neck.  He does endorse a history of acid reflux and notes that this episode felt different.  He did have some relief after taking an aspirin and Aleve.  Today he had another episode lasting about 10 minutes and reported to urgent care where they noted EKG changes and sent him to the emergency department.  He also notes some shortness of breath and chest pain with exertion.  He has a significant family history of early onset coronary artery disease in his family and has been following with a preventative cardiologist.  He was supposed to have a return visit later this month.  He does have an elevated coronary artery calcium score.  He has done a stress test in the past through [employer] [facility] that he believes was negative.  He thinks some of his symptoms could be related to reflux and he " is questioning whether this could be an ulcer.  He has tried Prilosec to treat symptoms which has not been effective.  Denies lower extremity swelling, history of blood clots, shortness of breath or chest pain at rest, or orthopnea.      Past Medical History    No past medical history on file.    Past Surgical History   No past surgical history on file.    Prior to Admission Medications   Prior to Admission Medications   Prescriptions Last Dose Informant Patient Reported? Taking?   cromolyn (OPTICROM) 4 % ophthalmic solution   No No   Sig: Place 1 drop into both eyes 4 times daily      Facility-Administered Medications: None           Physical Exam   Vital Signs: Temp: 97.9  F (36.6  C) Temp src: Temporal BP: 125/74 Pulse: 92   Resp: 12 SpO2: 97 % O2 Device: None (Room air)    Weight: 180 lbs 12.44 oz    GENERAL: No apparent distress.  HEENT: Patient is normocephalic, atraumatic.  EYES: Anicteric without injection.  RESPIRATORY: Clear to auscultation bilaterally.  CARDIOVASCULAR: Regular rate and rhythm.  Normal S1, S2 - no m/g/r.  GASTROINTESTINAL: The abdomen was normal in contour. Bowel sounds were present. Soft, non-tender without distension.  EXTREMITIES: Warm & well perfused. No edema  NEUROLOGIC: The patient was alert and conversation was appropriate. Grossly non-focal.  PSYCHIATRIC: Mood and affect congruent.  SKIN: Exposed skin is within normal limits.      Medical Decision Making       60 MINUTES SPENT BY ME on the date of service doing chart review, history, exam, documentation & further activities per the note.      Data   ------------------------- PAST 24 HR DATA REVIEWED -----------------------------------------------    I have personally reviewed the following data over the past 24 hrs:    9.5  \   15.0   / 295     135 95 (L) 11.6 /  129 (H)   3.9 30 (H) 0.83 \     ALT: 83 (H) AST: 46 (H) AP: 111 TBILI: 0.5   ALB: 4.9 TOT PROTEIN: 8.0 LIPASE: 40     Trop: 35 (H) BNP: N/A       Imaging results reviewed  over the past 24 hrs:   Recent Results (from the past 24 hour(s))   Chest XR,  PA & LAT    Narrative    EXAM: XR CHEST 2 VIEWS  LOCATION: [facility]  DATE: 3/2/2024    INDICATION: chest pain  COMPARISON: None.      Impression    IMPRESSION: Negative chest.

## 2024-03-03 NOTE — ED PROVIDER NOTES
History     Chief Complaint:  Chest Pain       HPI   Huyen Hickman is a 48 year old male with a history of hypertension, high cholesterol and elevated coronary artery calcium who presents with who present to the ED for an evaluation of chest pain.  Yesterday, patient had an episode of eye chest pain described as anterior chest discomfort radiating to bilateral arms that occurred at rest.  This lasted about 10 minutes and self resolved.  Today, patient went out to eat and [employer] restaurant.  He did not have spicy food.  While seated, patient developed anterior chest pain that radiated to bilateral shoulders neck and jaw.  This again self resolved after about 10 minutes.  Patient took baby aspirin and Aleve.  Patient does have a history of acid reflux however this is very different in nature than prior symptoms.  Denies any current abdominal pain.  No fall or trauma.  Patient does note that he has some baseline exertional chest discomfort and shortness of breath with ambulation.  Patient's brother had MI and family history of early coronary artery disease per his report.  Patient had provide on cardiology visit with positive calcium score in fall 2023.  Patient presents to urgent care and referred to ED for reported T wave inversion on EKG.  Patient does not have the EKG to accompany him.  No prior personal history of ACS.  At time of my evaluation the emergency department, patient is chest pain-free.  He has no ongoing discomfort in the shoulders jaw or chest.  Denies leg pain or swelling.  No history of PE or DVT.  No recent travel.  No leg pain or swelling.    Review of External notes  I have reviewed the patient [facility]  notes from 03/02/2024.     Medications:    No current outpatient medications on file.      Past Medical History:    No past medical history on file.    Past Surgical History:    No past surgical history on file.       Physical Exam   Patient Vitals for the past 24 hrs:   BP  "Temp Temp src Pulse Resp SpO2 Height Weight   03/02/24 2218 104/64 98.3  F (36.8  C) Oral 89 16 95 % 1.676 m (5' 6\") 80.2 kg (176 lb 11.2 oz)   03/02/24 2200 107/68 -- -- 82 16 95 % -- --   03/02/24 2130 111/65 -- -- 84 18 95 % -- --   03/02/24 2100 112/71 -- -- 82 16 95 % -- --   03/02/24 2030 114/72 -- -- 82 19 95 % -- --   03/02/24 2000 125/77 -- -- 85 15 96 % -- --   03/02/24 1900 122/82 -- -- 85 16 97 % -- --   03/02/24 1845 116/82 -- -- -- -- -- -- --   03/02/24 1815 139/77 -- -- -- -- -- -- --   03/02/24 1800 125/74 -- -- 92 12 97 % -- --   03/02/24 1645 (!) 148/101 97.9  F (36.6  C) Temporal 102 18 100 % 1.676 m (5' 6\") 82 kg (180 lb 12.4 oz)        Physical Exam    Gen: alert  Neck: normal ROM  CV: RRR, 2+ distal pulses in all 4 extremities  Chest: no tenderness over the chest wall  Pulm: breath sounds equal, lungs clear  Abd: Soft, nontender  Back: no evidence of injury  MSK: no lower extremity edema, no calf tenderness  Skin: no rash  Neuro: alert, appropriate conversation and interaction      Emergency Department Course   ECG:   ECG results from 03/02/24   EKG 12 lead     Value    Systolic Blood Pressure     Diastolic Blood Pressure     Ventricular Rate 88    Atrial Rate 88    AR Interval 160    QRS Duration 94        QTc 430    P Axis 52    R AXIS 30    T Axis 40    Interpretation ECG      Sinus rhythm  Normal ECG  No previous ECGs available       ECG  1st EKG 3/2/2024 1717  Nonspecific anterior ST change-   Qwave III  Rate 88 bmp  AR interval 160 ms  QRS 94 ms  Qt//430  P-R-T  52, 30 40        Imaging:  Chest XR,  PA & LAT   Final Result   IMPRESSION: Negative chest.          Laboratory:  Labs Ordered and Resulted from Time of ED Arrival to Time of ED Departure   BASIC METABOLIC PANEL - Abnormal       Result Value    Sodium 135      Potassium 3.9      Chloride 95 (*)     Carbon Dioxide (CO2) 30 (*)     Anion Gap 10      Urea Nitrogen 11.6      Creatinine 0.83      GFR Estimate >90      " Calcium 9.8      Glucose 129 (*)    TROPONIN T, HIGH SENSITIVITY - Abnormal    Troponin T, High Sensitivity 28 (*)    TROPONIN T, HIGH SENSITIVITY - Abnormal    Troponin T, High Sensitivity 35 (*)    HEPATIC FUNCTION PANEL - Abnormal    Protein Total 8.0      Albumin 4.9      Bilirubin Total 0.5      Alkaline Phosphatase 111      AST 46 (*)     ALT 83 (*)     Bilirubin Direct <0.20     LIPASE - Normal    Lipase 40     CBC WITH PLATELETS AND DIFFERENTIAL    WBC Count 9.5      RBC Count 4.96      Hemoglobin 15.0      Hematocrit 44.3      MCV 89      MCH 30.2      MCHC 33.9      RDW 11.4      Platelet Count 295      % Neutrophils 65      % Lymphocytes 26      % Monocytes 6      % Eosinophils 2      % Basophils 1      % Immature Granulocytes 0      NRBCs per 100 WBC 0      Absolute Neutrophils 6.2      Absolute Lymphocytes 2.4      Absolute Monocytes 0.6      Absolute Eosinophils 0.2      Absolute Basophils 0.1      Absolute Immature Granulocytes 0.0      Absolute NRBCs 0.0            ED Course as of 03/03/24 0040   Sat Mar 02, 2024   1815 I have evaluated the patient.    1820 CXR independent interpretation- no infiltrate and no effusion   1848 I spoke to Wong for . Hopsitalist            Interventions:  Medications   nitroGLYcerin (NITRODUR) 0.4 MG/HR 24 hr patch 1 patch (1 patch Transdermal $Patch/Med Applied 3/2/24 1844)                                 aspirin (ASA) tablet 325 mg (325 mg Oral $Given 3/2/24 1844)           Impression & Plan    Independent Interpretation:  I have reviewed the pt EKG from [facility] and it's not signifiacntly different from [facility] EKG    See ED course    Medical Decision Making:  Patient presents for chest pain.  Chest pain concerning for cardiac the symptoms.  At time of my evaluation, patient is chest pain-free.  I reviewed the patient's initial EKG from triage.  There is some nonspecific change in V2 V3 noted.  This did not meet STEMI criteria.  I recommended the  patient be roomed emergently with repeat EKG.  Repeat EKG did not show discrete evolving ischemic change.  [facility] EKG from urgent care obtained as noted above.  Patient with absolutely no chest pain during ED evaluation.  Initial troponin returned at 28.  Given need for serial troponins and likely further testing given significant past medical history and concerning historical presentation today, I recommended patient be admitted for observation, serial troponins and further evaluation as seen fit by the hospitalist team.    Later while performing documentation, I noted patient's troponins uptrending.  I contacted Dr. Figueroa of  the hospital service.  Given clinical context I thought this was likely to represent non-STEMI.  Dr. Figueroa was in agreement and will plan to start heparin.    Disposition:  Admission was discussed with Tegan Bear.  Admitting under the care of Dr. Mohan    Diagnosis:    ICD-10-CM    1. Chest pain, unspecified type  R07.9              Nery Tolentino  March 2, 2024         Nery Tolentino MD  03/03/24 0204       Nery Tolentino MD  03/03/24 0216

## 2024-03-03 NOTE — PLAN OF CARE
ROOM # 202-2    Living Situation (if not independent, order SW consult): Home   Facility name:  : [facility], [phone]     Activity level at baseline: IND  Activity level on admit: IND    Who will be transporting you at discharge: jaswinder Reyes other     Patient registered to observation; given Patient Bill of Rights; given the opportunity to ask questions about observation status and their plan of care.  Patient has been oriented to the observation room, bathroom and call light is in place.    Discussed discharge goals and expectations with patient/family.

## 2024-03-04 PROBLEM — I21.4 NSTEMI (NON-ST ELEVATED MYOCARDIAL INFARCTION) (H): Status: ACTIVE | Noted: 2024-03-04

## 2024-03-04 PROBLEM — I20.0 UNSTABLE ANGINA (H): Status: ACTIVE | Noted: 2024-03-04

## 2024-03-04 LAB
ACT BLD: 352 SECONDS (ref 74–150)
ANION GAP SERPL CALCULATED.3IONS-SCNC: 10 MMOL/L (ref 7–15)
APO A-I SERPL-MCNC: 12 MG/DL
ATRIAL RATE - MUSE: 88 BPM
ATRIAL RATE - MUSE: 92 BPM
BUN SERPL-MCNC: 11.9 MG/DL (ref 6–20)
CALCIUM SERPL-MCNC: 9.4 MG/DL (ref 8.6–10)
CHLORIDE SERPL-SCNC: 99 MMOL/L (ref 98–107)
CHOLEST SERPL-MCNC: 121 MG/DL
CREAT SERPL-MCNC: 0.85 MG/DL (ref 0.67–1.17)
DEPRECATED HCO3 PLAS-SCNC: 27 MMOL/L (ref 22–29)
DIASTOLIC BLOOD PRESSURE - MUSE: NORMAL MMHG
DIASTOLIC BLOOD PRESSURE - MUSE: NORMAL MMHG
EGFRCR SERPLBLD CKD-EPI 2021: >90 ML/MIN/1.73M2
ERYTHROCYTE [DISTWIDTH] IN BLOOD BY AUTOMATED COUNT: 11.4 % (ref 10–15)
GLUCOSE SERPL-MCNC: 119 MG/DL (ref 70–99)
HCT VFR BLD AUTO: 42 % (ref 40–53)
HDLC SERPL-MCNC: 32 MG/DL
HGB BLD-MCNC: 14.1 G/DL (ref 13.3–17.7)
INTERPRETATION ECG - MUSE: NORMAL
INTERPRETATION ECG - MUSE: NORMAL
LDLC SERPL CALC-MCNC: 60 MG/DL
MCH RBC QN AUTO: 29.9 PG (ref 26.5–33)
MCHC RBC AUTO-ENTMCNC: 33.6 G/DL (ref 31.5–36.5)
MCV RBC AUTO: 89 FL (ref 78–100)
NONHDLC SERPL-MCNC: 89 MG/DL
P AXIS - MUSE: 45 DEGREES
P AXIS - MUSE: 52 DEGREES
PLATELET # BLD AUTO: 244 10E3/UL (ref 150–450)
POTASSIUM SERPL-SCNC: 3.8 MMOL/L (ref 3.4–5.3)
PR INTERVAL - MUSE: 156 MS
PR INTERVAL - MUSE: 160 MS
QRS DURATION - MUSE: 92 MS
QRS DURATION - MUSE: 94 MS
QT - MUSE: 356 MS
QT - MUSE: 356 MS
QTC - MUSE: 430 MS
QTC - MUSE: 440 MS
R AXIS - MUSE: 30 DEGREES
R AXIS - MUSE: 35 DEGREES
RBC # BLD AUTO: 4.71 10E6/UL (ref 4.4–5.9)
SODIUM SERPL-SCNC: 136 MMOL/L (ref 135–145)
SYSTOLIC BLOOD PRESSURE - MUSE: NORMAL MMHG
SYSTOLIC BLOOD PRESSURE - MUSE: NORMAL MMHG
T AXIS - MUSE: 40 DEGREES
T AXIS - MUSE: 8 DEGREES
TRIGL SERPL-MCNC: 145 MG/DL
UFH PPP CHRO-ACNC: 0.41 IU/ML
UFH PPP CHRO-ACNC: 0.54 IU/ML
VENTRICULAR RATE- MUSE: 88 BPM
VENTRICULAR RATE- MUSE: 92 BPM
WBC # BLD AUTO: 9.8 10E3/UL (ref 4–11)

## 2024-03-04 PROCEDURE — 93799 UNLISTED CV SVC/PROCEDURE: CPT

## 2024-03-04 PROCEDURE — 250N000013 HC RX MED GY IP 250 OP 250 PS 637: Performed by: NURSE PRACTITIONER

## 2024-03-04 PROCEDURE — G0378 HOSPITAL OBSERVATION PER HR: HCPCS

## 2024-03-04 PROCEDURE — 96366 THER/PROPH/DIAG IV INF ADDON: CPT

## 2024-03-04 PROCEDURE — 027034Z DILATION OF CORONARY ARTERY, ONE ARTERY WITH DRUG-ELUTING INTRALUMINAL DEVICE, PERCUTANEOUS APPROACH: ICD-10-PCS | Performed by: INTERNAL MEDICINE

## 2024-03-04 PROCEDURE — 250N000013 HC RX MED GY IP 250 OP 250 PS 637: Performed by: INTERNAL MEDICINE

## 2024-03-04 PROCEDURE — 99233 SBSQ HOSP IP/OBS HIGH 50: CPT | Performed by: PHYSICIAN ASSISTANT

## 2024-03-04 PROCEDURE — 92928 PRQ TCAT PLMT NTRAC ST 1 LES: CPT | Mod: LC | Performed by: INTERNAL MEDICINE

## 2024-03-04 PROCEDURE — 99152 MOD SED SAME PHYS/QHP 5/>YRS: CPT | Performed by: INTERNAL MEDICINE

## 2024-03-04 PROCEDURE — 120N000001 HC R&B MED SURG/OB

## 2024-03-04 PROCEDURE — 272N000001 HC OR GENERAL SUPPLY STERILE: Performed by: INTERNAL MEDICINE

## 2024-03-04 PROCEDURE — 80048 BASIC METABOLIC PNL TOTAL CA: CPT | Performed by: NURSE PRACTITIONER

## 2024-03-04 PROCEDURE — 250N000011 HC RX IP 250 OP 636: Performed by: INTERNAL MEDICINE

## 2024-03-04 PROCEDURE — 99153 MOD SED SAME PHYS/QHP EA: CPT | Performed by: INTERNAL MEDICINE

## 2024-03-04 PROCEDURE — 250N000009 HC RX 250: Performed by: INTERNAL MEDICINE

## 2024-03-04 PROCEDURE — 85347 COAGULATION TIME ACTIVATED: CPT

## 2024-03-04 PROCEDURE — C1769 GUIDE WIRE: HCPCS | Performed by: INTERNAL MEDICINE

## 2024-03-04 PROCEDURE — B2111ZZ FLUOROSCOPY OF MULTIPLE CORONARY ARTERIES USING LOW OSMOLAR CONTRAST: ICD-10-PCS | Performed by: INTERNAL MEDICINE

## 2024-03-04 PROCEDURE — 99207 PR SC NO CHARGE VISIT: CPT | Performed by: INTERNAL MEDICINE

## 2024-03-04 PROCEDURE — 93010 ELECTROCARDIOGRAM REPORT: CPT | Performed by: INTERNAL MEDICINE

## 2024-03-04 PROCEDURE — C1894 INTRO/SHEATH, NON-LASER: HCPCS | Performed by: INTERNAL MEDICINE

## 2024-03-04 PROCEDURE — 36415 COLL VENOUS BLD VENIPUNCTURE: CPT | Performed by: NURSE PRACTITIONER

## 2024-03-04 PROCEDURE — 258N000003 HC RX IP 258 OP 636: Performed by: INTERNAL MEDICINE

## 2024-03-04 PROCEDURE — 96376 TX/PRO/DX INJ SAME DRUG ADON: CPT

## 2024-03-04 PROCEDURE — 83695 ASSAY OF LIPOPROTEIN(A): CPT | Performed by: NURSE PRACTITIONER

## 2024-03-04 PROCEDURE — 99232 SBSQ HOSP IP/OBS MODERATE 35: CPT | Mod: 25 | Performed by: NURSE PRACTITIONER

## 2024-03-04 PROCEDURE — C9600 PERC DRUG-EL COR STENT SING: HCPCS | Performed by: INTERNAL MEDICINE

## 2024-03-04 PROCEDURE — 85520 HEPARIN ASSAY: CPT | Performed by: NURSE PRACTITIONER

## 2024-03-04 PROCEDURE — 93454 CORONARY ARTERY ANGIO S&I: CPT | Performed by: INTERNAL MEDICINE

## 2024-03-04 PROCEDURE — 85027 COMPLETE CBC AUTOMATED: CPT | Performed by: NURSE PRACTITIONER

## 2024-03-04 PROCEDURE — C1887 CATHETER, GUIDING: HCPCS | Performed by: INTERNAL MEDICINE

## 2024-03-04 PROCEDURE — C1725 CATH, TRANSLUMIN NON-LASER: HCPCS | Performed by: INTERNAL MEDICINE

## 2024-03-04 PROCEDURE — C1874 STENT, COATED/COV W/DEL SYS: HCPCS | Performed by: INTERNAL MEDICINE

## 2024-03-04 PROCEDURE — 93454 CORONARY ARTERY ANGIO S&I: CPT | Mod: 26 | Performed by: INTERNAL MEDICINE

## 2024-03-04 PROCEDURE — 82465 ASSAY BLD/SERUM CHOLESTEROL: CPT | Performed by: NURSE PRACTITIONER

## 2024-03-04 DEVICE — STENT CORONARY DES SYNERGY XD MR US 2.75X16MM H7493941816270: Type: IMPLANTABLE DEVICE | Status: FUNCTIONAL

## 2024-03-04 RX ORDER — SODIUM CHLORIDE 9 MG/ML
INJECTION, SOLUTION INTRAVENOUS CONTINUOUS
Status: DISCONTINUED | OUTPATIENT
Start: 2024-03-04 | End: 2024-03-04 | Stop reason: HOSPADM

## 2024-03-04 RX ORDER — ONDANSETRON 4 MG/1
4 TABLET, ORALLY DISINTEGRATING ORAL EVERY 6 HOURS PRN
Status: DISCONTINUED | OUTPATIENT
Start: 2024-03-04 | End: 2024-03-05 | Stop reason: HOSPADM

## 2024-03-04 RX ORDER — NITROGLYCERIN 5 MG/ML
VIAL (ML) INTRAVENOUS
Status: DISCONTINUED | OUTPATIENT
Start: 2024-03-04 | End: 2024-03-04 | Stop reason: HOSPADM

## 2024-03-04 RX ORDER — ACETAMINOPHEN 325 MG/1
650 TABLET ORAL EVERY 4 HOURS PRN
Status: DISCONTINUED | OUTPATIENT
Start: 2024-03-04 | End: 2024-03-05 | Stop reason: HOSPADM

## 2024-03-04 RX ORDER — METOPROLOL SUCCINATE 25 MG/1
25 TABLET, EXTENDED RELEASE ORAL DAILY
Status: DISCONTINUED | OUTPATIENT
Start: 2024-03-05 | End: 2024-03-05 | Stop reason: HOSPADM

## 2024-03-04 RX ORDER — OXYCODONE HYDROCHLORIDE 10 MG/1
10 TABLET ORAL EVERY 4 HOURS PRN
Status: DISCONTINUED | OUTPATIENT
Start: 2024-03-04 | End: 2024-03-05 | Stop reason: HOSPADM

## 2024-03-04 RX ORDER — OXYCODONE HYDROCHLORIDE 5 MG/1
5 TABLET ORAL EVERY 4 HOURS PRN
Status: DISCONTINUED | OUTPATIENT
Start: 2024-03-04 | End: 2024-03-05 | Stop reason: HOSPADM

## 2024-03-04 RX ORDER — NALOXONE HYDROCHLORIDE 0.4 MG/ML
0.4 INJECTION, SOLUTION INTRAMUSCULAR; INTRAVENOUS; SUBCUTANEOUS
Status: ACTIVE | OUTPATIENT
Start: 2024-03-04 | End: 2024-03-04

## 2024-03-04 RX ORDER — ASPIRIN 81 MG/1
81 TABLET, CHEWABLE ORAL ONCE
Status: COMPLETED | OUTPATIENT
Start: 2024-03-04 | End: 2024-03-04

## 2024-03-04 RX ORDER — ATROPINE SULFATE 0.1 MG/ML
0.5 INJECTION INTRAVENOUS
Status: ACTIVE | OUTPATIENT
Start: 2024-03-04 | End: 2024-03-04

## 2024-03-04 RX ORDER — LORAZEPAM 0.5 MG/1
0.5 TABLET ORAL
Status: DISCONTINUED | OUTPATIENT
Start: 2024-03-04 | End: 2024-03-04 | Stop reason: HOSPADM

## 2024-03-04 RX ORDER — LIDOCAINE 40 MG/G
CREAM TOPICAL
Status: DISCONTINUED | OUTPATIENT
Start: 2024-03-04 | End: 2024-03-04 | Stop reason: HOSPADM

## 2024-03-04 RX ORDER — FENTANYL CITRATE 50 UG/ML
25 INJECTION, SOLUTION INTRAMUSCULAR; INTRAVENOUS
Status: DISCONTINUED | OUTPATIENT
Start: 2024-03-04 | End: 2024-03-05 | Stop reason: HOSPADM

## 2024-03-04 RX ORDER — HEPARIN SODIUM 1000 [USP'U]/ML
INJECTION, SOLUTION INTRAVENOUS; SUBCUTANEOUS
Status: DISCONTINUED | OUTPATIENT
Start: 2024-03-04 | End: 2024-03-04 | Stop reason: HOSPADM

## 2024-03-04 RX ORDER — FENTANYL CITRATE 50 UG/ML
INJECTION, SOLUTION INTRAMUSCULAR; INTRAVENOUS
Status: DISCONTINUED | OUTPATIENT
Start: 2024-03-04 | End: 2024-03-04 | Stop reason: HOSPADM

## 2024-03-04 RX ORDER — VERAPAMIL HYDROCHLORIDE 2.5 MG/ML
INJECTION, SOLUTION INTRAVENOUS
Status: DISCONTINUED | OUTPATIENT
Start: 2024-03-04 | End: 2024-03-04 | Stop reason: HOSPADM

## 2024-03-04 RX ORDER — METOPROLOL TARTRATE 1 MG/ML
5 INJECTION, SOLUTION INTRAVENOUS
Status: DISCONTINUED | OUTPATIENT
Start: 2024-03-04 | End: 2024-03-05 | Stop reason: HOSPADM

## 2024-03-04 RX ORDER — IOPAMIDOL 755 MG/ML
INJECTION, SOLUTION INTRAVASCULAR
Status: DISCONTINUED | OUTPATIENT
Start: 2024-03-04 | End: 2024-03-04 | Stop reason: HOSPADM

## 2024-03-04 RX ORDER — FLUMAZENIL 0.1 MG/ML
0.2 INJECTION, SOLUTION INTRAVENOUS
Status: ACTIVE | OUTPATIENT
Start: 2024-03-04 | End: 2024-03-04

## 2024-03-04 RX ORDER — ONDANSETRON 2 MG/ML
4 INJECTION INTRAMUSCULAR; INTRAVENOUS EVERY 6 HOURS PRN
Status: DISCONTINUED | OUTPATIENT
Start: 2024-03-04 | End: 2024-03-05 | Stop reason: HOSPADM

## 2024-03-04 RX ORDER — NALOXONE HYDROCHLORIDE 0.4 MG/ML
0.2 INJECTION, SOLUTION INTRAMUSCULAR; INTRAVENOUS; SUBCUTANEOUS
Status: ACTIVE | OUTPATIENT
Start: 2024-03-04 | End: 2024-03-04

## 2024-03-04 RX ORDER — NITROGLYCERIN 0.4 MG/1
0.4 TABLET SUBLINGUAL EVERY 5 MIN PRN
Status: DISCONTINUED | OUTPATIENT
Start: 2024-03-04 | End: 2024-03-05 | Stop reason: HOSPADM

## 2024-03-04 RX ORDER — LORAZEPAM 2 MG/ML
0.5 INJECTION INTRAMUSCULAR
Status: DISCONTINUED | OUTPATIENT
Start: 2024-03-04 | End: 2024-03-04 | Stop reason: HOSPADM

## 2024-03-04 RX ORDER — POTASSIUM CHLORIDE 1500 MG/1
20 TABLET, EXTENDED RELEASE ORAL
Status: COMPLETED | OUTPATIENT
Start: 2024-03-04 | End: 2024-03-04

## 2024-03-04 RX ORDER — ASPIRIN 81 MG/1
81 TABLET ORAL DAILY
Status: DISCONTINUED | OUTPATIENT
Start: 2024-03-05 | End: 2024-03-05 | Stop reason: HOSPADM

## 2024-03-04 RX ORDER — ASPIRIN 81 MG/1
243 TABLET, CHEWABLE ORAL ONCE
Status: COMPLETED | OUTPATIENT
Start: 2024-03-04 | End: 2024-03-04

## 2024-03-04 RX ORDER — ASPIRIN 325 MG
325 TABLET ORAL ONCE
Status: COMPLETED | OUTPATIENT
Start: 2024-03-04 | End: 2024-03-04

## 2024-03-04 RX ORDER — LISINOPRIL 10 MG/1
10 TABLET ORAL DAILY
Status: DISCONTINUED | OUTPATIENT
Start: 2024-03-05 | End: 2024-03-05 | Stop reason: HOSPADM

## 2024-03-04 RX ORDER — HYDRALAZINE HYDROCHLORIDE 20 MG/ML
10 INJECTION INTRAMUSCULAR; INTRAVENOUS EVERY 4 HOURS PRN
Status: DISCONTINUED | OUTPATIENT
Start: 2024-03-04 | End: 2024-03-05 | Stop reason: HOSPADM

## 2024-03-04 RX ORDER — SODIUM CHLORIDE 9 MG/ML
INJECTION, SOLUTION INTRAVENOUS CONTINUOUS
Status: ACTIVE | OUTPATIENT
Start: 2024-03-04 | End: 2024-03-04

## 2024-03-04 RX ADMIN — PANTOPRAZOLE SODIUM 40 MG: 40 TABLET, DELAYED RELEASE ORAL at 08:34

## 2024-03-04 RX ADMIN — ATORVASTATIN CALCIUM 40 MG: 40 TABLET, FILM COATED ORAL at 11:49

## 2024-03-04 RX ADMIN — POTASSIUM CHLORIDE 20 MEQ: 1500 TABLET, EXTENDED RELEASE ORAL at 11:49

## 2024-03-04 RX ADMIN — TICAGRELOR 90 MG: 90 TABLET ORAL at 20:17

## 2024-03-04 RX ADMIN — Medication 10 MG: at 21:55

## 2024-03-04 RX ADMIN — ASPIRIN 325 MG ORAL TABLET 325 MG: 325 PILL ORAL at 08:34

## 2024-03-04 RX ADMIN — SODIUM CHLORIDE: 9 INJECTION, SOLUTION INTRAVENOUS at 08:36

## 2024-03-04 RX ADMIN — METOPROLOL TARTRATE 12.5 MG: 25 TABLET, FILM COATED ORAL at 20:17

## 2024-03-04 ASSESSMENT — ACTIVITIES OF DAILY LIVING (ADL)
ADLS_ACUITY_SCORE: 31
DRESSING/BATHING_DIFFICULTY: NO
ADLS_ACUITY_SCORE: 31
ADLS_ACUITY_SCORE: 18
ADLS_ACUITY_SCORE: 18
DOING_ERRANDS_INDEPENDENTLY_DIFFICULTY: NO
FALL_HISTORY_WITHIN_LAST_SIX_MONTHS: NO
ADLS_ACUITY_SCORE: 31
ADLS_ACUITY_SCORE: 31
TOILETING_ISSUES: NO
ADLS_ACUITY_SCORE: 31
CONCENTRATING,_REMEMBERING_OR_MAKING_DECISIONS_DIFFICULTY: NO
ADLS_ACUITY_SCORE: 31
DIFFICULTY_EATING/SWALLOWING: NO
ADLS_ACUITY_SCORE: 31
ADLS_ACUITY_SCORE: 31
ADLS_ACUITY_SCORE: 18
ADLS_ACUITY_SCORE: 31
WALKING_OR_CLIMBING_STAIRS_DIFFICULTY: NO
ADLS_ACUITY_SCORE: 31
WEAR_GLASSES_OR_BLIND: NO
ADLS_ACUITY_SCORE: 31
ADLS_ACUITY_SCORE: 18
ADLS_ACUITY_SCORE: 31
CHANGE_IN_FUNCTIONAL_STATUS_SINCE_ONSET_OF_CURRENT_ILLNESS/INJURY: NO
ADLS_ACUITY_SCORE: 31

## 2024-03-04 NOTE — PROGRESS NOTES
Swelling noticed above TR band, Coban loosened- patient reported improvement in pain, during the next CMS and vitals check swelling unchanged, Cristy from cath lab came and held manual pressure, swelling decreased and marked.       @1725 Paged Dr. Basilio via [employer] Messaging regarding increased pain above TR band where hematoma was present. Per provider ok to continue to remove air as long as pulse is intact.    @1805  came to the bedside to assess angio site, recommended elevation and ice for swelling and continue removal of TR band per order set as long as pulse is unchanged and site does not extend past marked border

## 2024-03-04 NOTE — PROCEDURES
[facility]    Procedure: *Cath with PCI    Date/Time: 3/4/2024 1:30 PM    Performed by: Lev Basilio MD  Authorized by: Lev Basilio MD      UNIVERSAL PROTOCOL   Site Marked: Yes  Prior Images Obtained and Reviewed:  Yes  Required items: Required blood products, implants, devices and special equipment available    Patient identity confirmed:  Verbally with patient  Patient was reevaluated immediately before administering moderate or deep sedation or anesthesia  Confirmation Checklist:  Patient's identity using two indicators  Time out: Immediately prior to the procedure a time out was called    Universal Protocol: the [facility] Universal Protocol was followed      SEDATION  Patient Sedated: Yes    Sedation:  Fentanyl and midazolam  Vital signs: Vital signs monitored during sedation      PROCEDURE  Describe Procedure: Procedure  1) CAG  2) PCI distal CX 2.75 x 16 mm everolimus eluting Synergy XD stent post dilated to 3.0 mm  Approach RTR  Complications none  Indication NSETMI    Findings   LMCA no significant stenosis  LAD mild atheromatous changes  RCA dominant mild atheromatous changes  CX focal distal 95% after M2    We placed a 2.75 x 16 mm everolimus eluting Synergy XD stent, post dilated to 3.0 mm    Post intervention : no residual stenosis. Distal JC 3 flow  Patient Tolerance:  Patient tolerated the procedure well with no immediate complications  Length of time physician/provider present for 1:1 monitoring during sedation: 45

## 2024-03-04 NOTE — PLAN OF CARE
LEVI giving report: Timothy SIMS receiving report: Jessica MATTA:  Procedure performed: coronary angiogram    B:  Why procedure needed: Chest pain    A:  Assessment of area: intact    R:  Recommendations: follow protocol to remove TR band      Family updated: yes

## 2024-03-04 NOTE — PLAN OF CARE
PRIMARY DIAGNOSIS: CHEST PAIN  OUTPATIENT/OBSERVATION GOALS TO BE MET BEFORE DISCHARGE:  1. Ruled out acute coronary syndrome (negative or stable Troponin):  No  2. Pain Status: Pain free  3. Appropriate provocative testing performed: No  - Interpretation of cardiac rhythm per telemetry tech: ST    4. Cleared by Consultants (if applicable):No  5. Return to near baseline physical activity: Yes  Discharge Planner Nurse   Safe discharge environment identified: Yes  Barriers to discharge: Yes       Entered by: Fanny Morrow RN 03/04/2024 1:25 AM     Please review provider order for any additional goals.   Nurse to notify provider when observation goals have been met and patient is ready for discharge.  Vitals are Temp: 98.4  F (36.9  C) Temp src: Oral BP: 90/53 Pulse: 82   Resp: 16 SpO2: 96 %.  Patient is Alert and Oriented x4. They are independent with no assistive devices .  Pt is a cardiac diet.  Denies pain. Patient has Heparin running at 1110 units per hour. Denies nausea/dizziness/SOB. Denies chest pain. Requested PRN Melatonin for sleep, administered. Cardiology following. Plan is for angiogram tomorrow.

## 2024-03-04 NOTE — PROGRESS NOTES
"Cardiology Progress Note  Macrina Acevedo FRANCINE, CNP     Seen by [facility] cardiology       Assessment and Plan:     Admit (3/2/24) with recurrent chest pain to both arms  Evidence of NSTEMI    PMH:  Hypertension, hyperlipidemia, elevated calcium score, family hx of premature coronary artery disease, GERD    NSTEMI:  -troponin peak 69, no significant ST abnormalities  -LVEF 60-65% with normal wall motion, normal RV function, no significant valvular pathology  -calcium score 117, mainly in CFX (12/2023)  Normal stress EKG (1/5/24)  -Plan for coronary angiogram today  -No CP overnight  -on Heparin, beta-blocker, ASA, ACE-I    Hypertension:  -home hydrochlorothiazide stopped   -low dose Metoprolol to be added today  -will lower Lisinopril dose to 10 mg  -BP well controlled    Hyperlipidemia:  -recently started on Atorvastatin  (baseline )  -check LP(a)     Prediabetes:  -Hgb A1C 6.0  -discussed lifestyle modifications               Interval History:     Denies any chest pain, SOB, palpitations, dizziness                Medications:      aspirin  325 mg Oral Once    Or    aspirin  243 mg Oral Once    aspirin  81 mg Oral Daily    atorvastatin  40 mg Oral Daily    lisinopril  20 mg Oral Daily    metoprolol tartrate  12.5 mg Oral BID    pantoprazole  40 mg Oral QAM AC    sodium chloride (PF)  3 mL Intracatheter Q8H            Physical Exam:   Blood pressure 96/64, pulse 81, temperature 97.7  F (36.5  C), temperature source Oral, resp. rate 16, height 1.676 m (5' 6\"), weight 80.2 kg (176 lb 11.2 oz), SpO2 97%.  Wt Readings from Last 3 Encounters:   03/02/24 80.2 kg (176 lb 11.2 oz)   05/03/15 75.6 kg (166 lb 11.2 oz)     No intake/output data recorded.    CONST:  alert and oriented  NAD  LUNGS:  CTA bilaterally  CARDIO:  RRR S1, S2  No murmurs  ABD:  soft    EXT:  no edema  2+ DP bilaterally           Data:   TELE:  SR/ST    CBC  Recent Labs   Lab 03/04/24  0359 03/02/24  1647   WBC 9.8 9.5   HGB 14.1 15.0    " "295       BMP  Recent Labs   Lab 03/04/24  0359 03/02/24  1647    135   POTASSIUM 3.8 3.9   CHLORIDE 99 95*   ASHLEY 9.4 9.8   CO2 27 30*   BUN 11.9 11.6   CR 0.85 0.83   * 129*     No results for input(s): \"CHOL\", \"HDL\", \"LDL\", \"TRIG\", \"CHOLHDLRATIO\" in the last 37682 hours.    TROP  No results found for: \"TROPI\", \"TROPONIN\", \"TROPR\"    BNP  No results for input(s): \"NTBNPI\", \"NTBNP\" in the last 168 hours.         "

## 2024-03-04 NOTE — PROGRESS NOTES
[facility]    Medicine Progress Note - Hospitalist Service    Date of Admission:  3/2/2024    Assessment & Plan   Huyen Hickman is a 48 year old male with PMHx significant for HTN, HLD, GERD, prediabetes and elevated coronary calcium, who was admitted on 3/2/2024 for radiating chest pain and elevated troponin, found to have a NSTEMI.      #NSTEMI  #Unstable angina  Presented with chest pain that radiated to arms and jaw. Likely ischemic with significant past family history of CAD and early cardiac death. Also had CT Coronary Ca score done in December that was elevated.    EKG changes per report from urgent care-- ST changes in the inferior leads.  Flipped T-wave in lead III and aVF.    Given family hx and elevated Ca score, has been following with a preventative Cardiologist in the  system.  - troponin trended, rising; 28-->35-->47-->55-->69. Ok to stop trending given plan for angiogram  - Heparin gtt started 3/3 given rising troponin  - Cardiology consulted.  Planning for angiogram today  - Echo done 3/3,  no wall motion abnormalities, EF 60-65%.   - Continue tele monitoring  - Cardiology checking lipoprotein(a) and lipid panel, pending. In 12/2023, total cholesterol 246, , HDL 31  - Continue baby ASA and atorvastatin 40 mg daily.   - Continue lisinopril, dose lowered to 10 mg by Cardiology today. BPs still soft  - discontinue hydrochlorothiazide.    - Metoprolol tartrate 12.5 mg BID started today with hold parameters (SBP < 100 or HR < 60).  - PRN nitroglycerin SL, Morphine IV 2 mg q2hrs PRN chest pain.    - admit to inpatient given NSTEMI requiring heparin gtt and angiogram today    #HTN  As above.    - Discontinue hydrochlorothiazide.    - Continue lisinopril, dose reduced to 10 mg today  - start metoprolol today.   #Metabolic syndrome // Prediabetic  HgbA1c 6.0 on 3/2. Repeat lipid panel pending, previously done in 12/2023, total cholesterol 246, , HDL 31  -  "Continue atorvastatin 40 mg.   #Strong family history of cardiac disease  Premature CAD in the last 3 generations, almost every male in his life  in their mid 50s.  The patient's brother had a MI recently requiring PCI at age 49.   #GERD  Previous history of GERD.  Uses TUMS or PPI PRN.    - Continue PPI while on heparin/ASA        Diet: NPO for Medical/Clinical Reasons Except for: Meds  NPO per Anesthesia Guidelines for Procedure/Surgery Except for: Meds, Ice Chips    DVT Prophylaxis: Heparin gtt  Shipman Catheter: Not present  Lines: None     Cardiac Monitoring: ACTIVE order. Indication: AMI (NSTEMI/ STEMI) (48 hours)  Code Status: Full Code      Clinically Significant Risk Factors Present on Admission                # Drug Induced Platelet Defect: home medication list includes an antiplatelet medication   # Hypertension: Home medication list includes antihypertensive(s)      # Overweight: Estimated body mass index is 28.52 kg/m  as calculated from the following:    Height as of this encounter: 1.676 m (5' 6\").    Weight as of this encounter: 80.2 kg (176 lb 11.2 oz).              Disposition Plan      Expected Discharge Date: 2024                  The patient's care was discussed with the Bedside Nurse and Patient.    Ellen Jim PA-C  Hospitalist Service  [facility]  Securely message with [facility] (more info)  Text page via [employer] Paging/Directory   ______________________________________________________________________    Interval History   Denies chest pain, ambulatory in room. NPO for angiogram today    Physical Exam   Vital Signs: Temp: 97.7  F (36.5  C) Temp src: Oral BP: 96/64 Pulse: 81   Resp: 16 SpO2: 97 % O2 Device: None (Room air)    Weight: 176 lbs 11.2 oz    GENERAL:  Comfortable.  PSYCH: pleasant, oriented, No acute distress.  HEART:  RRR  LUNGS:  Normal Respiratory effort.   EXTREMITIES:  able to ambulate independently   SKIN:  Dry to touch, No rash, wound or " ulcerations.  NEUROLOGIC:  grossly intact    Medical Decision Making       65 MINUTES SPENT BY ME on the date of service doing chart review, history, exam, documentation & further activities per the note.      Data     I have personally reviewed the following data over the past 24 hrs:    9.8  \   14.1   / 244     136 99 11.9 /  119 (H)   3.8 27 0.85 \     TSH: N/A T4: N/A A1C: N/A       Imaging results reviewed over the past 24 hrs:   Recent Results (from the past 24 hour(s))   Echocardiogram Complete   Result Value    LVEF  60-65%    Narrative    [id number]  [id number]  [id number]  [id number]^STEPHENIE^YOSVANY^ARIELLE     [facility]  Echocardiography Laboratory  [address] [place] [address]     Name: BOSSMAN VALVERDE  MRN: [id number]  : 1975  Study Date: 2024 02:02 PM  Age: 48 yrs  Gender: Male  Patient Location: [facility]  Reason For Study: MI - Subendocardial  Ordering Physician: YOSVANY CASIANO  Referring Physician: Richard [address] [place]  Performed By: Shima Acevedo     BSA: 1.9 m2  Height: 66 in  Weight: 176 lb  HR: 95  BP: 118/82 mmHg  ______________________________________________________________________________  Procedure  Complete Portable Echo Adult. Optison (NDC #[id number]) given intravenously.  Technically difficult study.  ______________________________________________________________________________  Interpretation Summary     The visual ejection fraction is 60-65%.  Left ventricular systolic function is normal.  The study was technically difficult. The study was technically limited.  ______________________________________________________________________________  Left Ventricle  The left ventricle is normal in size. There is normal left ventricular wall  thickness. Diastolic Doppler findings (E/E' ratio and/or other parameters)  suggest left ventricular filling pressures are normal. The visual ejection  fraction is 60-65%. Left ventricular systolic  function is normal.     Right Ventricle  The right ventricle is normal in size and function.     Atria  Normal left atrial size. Right atrial size is normal. There is no color  Doppler evidence of an atrial shunt.     Mitral Valve  There is trace mitral regurgitation.     Tricuspid Valve  There is trace tricuspid regurgitation. Right ventricular systolic pressure  could not be approximated due to inadequate tricuspid regurgitation.     Aortic Valve  The aortic valve is not well visualized. No aortic regurgitation is present.  No hemodynamically significant valvular aortic stenosis.     Pulmonic Valve  There is no pulmonic valvular regurgitation.     Vessels  The aortic root is normal size. Normal size ascending aorta. IVC diameter <2.1  cm collapsing >50% with sniff suggests a normal RA pressure of 3 mmHg.     Pericardium  There is no pericardial effusion.     Rhythm  Sinus rhythm was noted.  ______________________________________________________________________________  MMode/2D Measurements & Calculations  IVSd: 0.40 cm     LVIDd: 5.2 cm  LVIDs: 3.9 cm  LVPWd: 0.53 cm  FS: 24.1 %  LV mass(C)d: 74.9 grams  LV mass(C)dI: 39.6 grams/m2  Ao root diam: 3.3 cm  asc Aorta Diam: 3.1 cm  LVOT diam: 2.1 cm  LVOT area: 3.4 cm2  Ao root diam index Ht(cm/m): 2.0  Ao root diam index BSA (cm/m2): 1.8  Asc Ao diam index BSA (cm/m2): 1.7  Asc Ao diam index Ht(cm/m): 1.9  RWT: 0.21  TAPSE: 2.5 cm     Doppler Measurements & Calculations  MV E max guillermo: 49.3 cm/sec  MV A max guillermo: 59.2 cm/sec  MV E/A: 0.83  MV max P.4 mmHg  MV mean P.84 mmHg  MV V2 VTI: 14.6 cm  MVA(VTI): 4.1 cm2     MV dec slope: 266.5 cm/sec2  MV dec time: 0.18 sec  Ao V2 max: 129.0 cm/sec  Ao max P.0 mmHg  Ao V2 mean: 86.9 cm/sec  Ao mean P.0 mmHg  Ao V2 VTI: 19.0 cm  GLENDA(I,D): 3.1 cm2  GLENDA(V,D): 3.1 cm2  LV V1 max P.6 mmHg  LV V1 max: 118.0 cm/sec  LV V1 VTI: 17.3 cm  SV(LVOT): 59.3 ml  SI(LVOT): 31.3 ml/m2  AV Guillermo Ratio (DI): 0.91  GLENDA Index  (cm2/m2): 1.6  E/E' av.0  Lateral E/e': 3.3  Medial E/e': 4.6  RV S Guillermo: 18.1 cm/sec     ______________________________________________________________________________  Report approved by: Kartik Preston 2024 03:04 PM

## 2024-03-04 NOTE — PLAN OF CARE
PRIMARY DIAGNOSIS: CHEST PAIN  OUTPATIENT/OBSERVATION GOALS TO BE MET BEFORE DISCHARGE:  1. Ruled out acute coronary syndrome (negative or stable Troponin):  No  2. Pain Status: Pain free  3. Appropriate provocative testing performed: No  - Interpretation of cardiac rhythm per telemetry tech: ST    4. Cleared by Consultants (if applicable):No  5. Return to near baseline physical activity: Yes  Discharge Planner Nurse   Safe discharge environment identified: Yes  Barriers to discharge: Yes       Entered by: Fanny Morrow RN 03/04/2024 1:28 AM     Please review provider order for any additional goals.   Nurse to notify provider when observation goals have been met and patient is ready for discharge.  Vitals are Temp: 98.4  F (36.9  C) Temp src: Oral BP: 90/53 Pulse: 82   Resp: 16 SpO2: 96 %.  Patient is Alert and Oriented x4. They are independent with no assistive devices .  Pt is a NPO diet.  Denies pain. Patient has Heparin running at 1110 units per hour. Cardiology following. Plan is for angiogram today.

## 2024-03-04 NOTE — PRE-PROCEDURE
GENERAL PRE-PROCEDURE:   Procedure:  Coronary angiogram possible percutaneous coronary intervention  Date/Time:  3/4/2024 12:25 PM    Verbal consent obtained?: Yes    Written consent obtained?: Yes    Risks and benefits: Risks, benefits and alternatives were discussed    DC Plan: Appropriate discharge home plan in place for patients who are going home after procedure   Patient states understanding of procedure being performed: Yes    Patient's understanding of procedure matches consent: Yes    Procedure consent matches procedure scheduled: Yes    Appropriately NPO:  Yes  Mallampati  :  Grade 2- soft palate, base of uvula, tonsillar pillars, and portion of posterior pharyngeal wall visible  Lungs:  Lungs clear with good breath sounds bilaterally  Heart:  Normal heart sounds and rate  History & Physical reviewed:  History and physical reviewed and no updates needed  Statement of review:  I have reviewed the lab findings, diagnostic data, medications, and the plan for sedation  I have examined the patient, reviewed the history, medications and pre procedural tests. He describes chest discomfort and mildly elevated troponin thus diagnosed with NSTEMI I have explained to the patient the risks of death, MI, stroke, hematoma, possible urgent bypass surgery for failed PCI, use of stents, thienopyridine agents, possible peripheral vascular complications, arrhythmia, the use of FFR in clinical decision-making and alternative of medical therapy alone in regards to left heart catheterization, left ventriculography, coronary angiography, and possible percutaneous coronary intervention. The patient voiced understanding and wishes to proceed. The patient has a good right radial pulse, normal ulnar pulse and a normal Skip's sign.

## 2024-03-04 NOTE — PLAN OF CARE
PRIMARY DIAGNOSIS: CHEST PAIN  OUTPATIENT/OBSERVATION GOALS TO BE MET BEFORE DISCHARGE:  1. Ruled out acute coronary syndrome (negative or stable Troponin):  No  2. Pain Status: Pain free  3. Appropriate provocative testing performed: No  - Interpretation of cardiac rhythm per telemetry tech: ST    4. Cleared by Consultants (if applicable):No  5. Return to near baseline physical activity: Yes  Discharge Planner Nurse   Safe discharge environment identified: Yes  Barriers to discharge: Yes       Entered by: Fanny Morrow RN 03/04/2024 4:26 AM     Please review provider order for any additional goals.   Nurse to notify provider when observation goals have been met and patient is ready for discharge.  Vitals are Temp: 98.4  F (36.9  C) Temp src: Oral BP: 90/53 Pulse: 82   Resp: 16 SpO2: 96 %.  Patient is Alert and Oriented x4. They are independent with no assistive devices .  Pt is a NPO diet.  Denies pain. Patient has Heparin running at 1110 units per hour. Cardiology following. Plan is for angiogram today.

## 2024-03-05 ENCOUNTER — APPOINTMENT (OUTPATIENT)
Dept: PHYSICAL THERAPY | Facility: CLINIC | Age: 49
End: 2024-03-05
Attending: INTERNAL MEDICINE
Payer: COMMERCIAL

## 2024-03-05 VITALS
SYSTOLIC BLOOD PRESSURE: 115 MMHG | OXYGEN SATURATION: 97 % | WEIGHT: 176.7 LBS | TEMPERATURE: 97.6 F | HEIGHT: 66 IN | HEART RATE: 82 BPM | DIASTOLIC BLOOD PRESSURE: 78 MMHG | RESPIRATION RATE: 16 BRPM | BODY MASS INDEX: 28.4 KG/M2

## 2024-03-05 LAB
ANION GAP SERPL CALCULATED.3IONS-SCNC: 9 MMOL/L (ref 7–15)
ATRIAL RATE - MUSE: 89 BPM
BUN SERPL-MCNC: 12.1 MG/DL (ref 6–20)
CALCIUM SERPL-MCNC: 9.1 MG/DL (ref 8.6–10)
CHLORIDE SERPL-SCNC: 103 MMOL/L (ref 98–107)
CHOLEST SERPL-MCNC: 118 MG/DL
CREAT SERPL-MCNC: 0.82 MG/DL (ref 0.67–1.17)
DEPRECATED HCO3 PLAS-SCNC: 24 MMOL/L (ref 22–29)
DIASTOLIC BLOOD PRESSURE - MUSE: NORMAL MMHG
EGFRCR SERPLBLD CKD-EPI 2021: >90 ML/MIN/1.73M2
GLUCOSE SERPL-MCNC: 98 MG/DL (ref 70–99)
HDLC SERPL-MCNC: 28 MG/DL
INTERPRETATION ECG - MUSE: NORMAL
LDLC SERPL CALC-MCNC: 61 MG/DL
NONHDLC SERPL-MCNC: 90 MG/DL
P AXIS - MUSE: 52 DEGREES
POTASSIUM SERPL-SCNC: 4.5 MMOL/L (ref 3.4–5.3)
PR INTERVAL - MUSE: 156 MS
QRS DURATION - MUSE: 90 MS
QT - MUSE: 356 MS
QTC - MUSE: 433 MS
R AXIS - MUSE: 25 DEGREES
SODIUM SERPL-SCNC: 136 MMOL/L (ref 135–145)
SYSTOLIC BLOOD PRESSURE - MUSE: NORMAL MMHG
T AXIS - MUSE: 37 DEGREES
TRIGL SERPL-MCNC: 143 MG/DL
VENTRICULAR RATE- MUSE: 89 BPM

## 2024-03-05 PROCEDURE — 80048 BASIC METABOLIC PNL TOTAL CA: CPT | Performed by: INTERNAL MEDICINE

## 2024-03-05 PROCEDURE — 99232 SBSQ HOSP IP/OBS MODERATE 35: CPT | Performed by: NURSE PRACTITIONER

## 2024-03-05 PROCEDURE — 99238 HOSP IP/OBS DSCHRG MGMT 30/<: CPT | Performed by: PHYSICIAN ASSISTANT

## 2024-03-05 PROCEDURE — 97161 PT EVAL LOW COMPLEX 20 MIN: CPT | Mod: GP

## 2024-03-05 PROCEDURE — 250N000013 HC RX MED GY IP 250 OP 250 PS 637: Performed by: NURSE PRACTITIONER

## 2024-03-05 PROCEDURE — 80061 LIPID PANEL: CPT | Performed by: INTERNAL MEDICINE

## 2024-03-05 PROCEDURE — 250N000013 HC RX MED GY IP 250 OP 250 PS 637: Performed by: INTERNAL MEDICINE

## 2024-03-05 PROCEDURE — 97530 THERAPEUTIC ACTIVITIES: CPT | Mod: GP

## 2024-03-05 PROCEDURE — 36415 COLL VENOUS BLD VENIPUNCTURE: CPT | Performed by: INTERNAL MEDICINE

## 2024-03-05 RX ORDER — NITROGLYCERIN 0.4 MG/1
TABLET SUBLINGUAL
Qty: 24 TABLET | Refills: 1 | Status: SHIPPED | OUTPATIENT
Start: 2024-03-05

## 2024-03-05 RX ORDER — METOPROLOL SUCCINATE 25 MG/1
25 TABLET, EXTENDED RELEASE ORAL DAILY
Qty: 30 TABLET | Refills: 0 | Status: SHIPPED | OUTPATIENT
Start: 2024-03-06

## 2024-03-05 RX ORDER — LISINOPRIL 10 MG/1
10 TABLET ORAL DAILY
Qty: 30 TABLET | Refills: 0 | Status: SHIPPED | OUTPATIENT
Start: 2024-03-06

## 2024-03-05 RX ORDER — NITROGLYCERIN 0.4 MG/1
TABLET SUBLINGUAL
Qty: 24 TABLET | Refills: 1 | Status: SHIPPED | OUTPATIENT
Start: 2024-03-05 | End: 2024-03-05

## 2024-03-05 RX ORDER — ATORVASTATIN CALCIUM 80 MG/1
80 TABLET, FILM COATED ORAL DAILY
Qty: 90 TABLET | Refills: 3 | Status: SHIPPED | OUTPATIENT
Start: 2024-03-05 | End: 2024-03-05

## 2024-03-05 RX ORDER — ASPIRIN 81 MG/1
81 TABLET ORAL DAILY
Qty: 30 TABLET | Refills: 3 | Status: SHIPPED | OUTPATIENT
Start: 2024-03-05 | End: 2024-03-05

## 2024-03-05 RX ADMIN — METOPROLOL SUCCINATE 25 MG: 25 TABLET, EXTENDED RELEASE ORAL at 07:58

## 2024-03-05 RX ADMIN — LISINOPRIL 10 MG: 10 TABLET ORAL at 07:58

## 2024-03-05 RX ADMIN — ASPIRIN 81 MG: 81 TABLET ORAL at 07:57

## 2024-03-05 RX ADMIN — TICAGRELOR 90 MG: 90 TABLET ORAL at 07:58

## 2024-03-05 RX ADMIN — PANTOPRAZOLE SODIUM 40 MG: 40 TABLET, DELAYED RELEASE ORAL at 07:58

## 2024-03-05 ASSESSMENT — ACTIVITIES OF DAILY LIVING (ADL)
ADLS_ACUITY_SCORE: 18
ADLS_ACUITY_SCORE: 20
ADLS_ACUITY_SCORE: 18
ADLS_ACUITY_SCORE: 18

## 2024-03-05 NOTE — PROGRESS NOTES
"Cardiology Progress Note  Macrina Acevedo APRN, CNP     Seen by [facility] cardiology       Assessment and Plan:     Admit (3/2/24) with recurrent chest pain to both arms  Evidence of NSTEMI    PMH:  Hypertension, hyperlipidemia, elevated calcium score, family hx of premature coronary artery disease, GERD    NSTEMI:  -troponin peak 69, no significant ST abnormalities  -LVEF 60-65% with normal wall motion, normal RV function, no significant valvular pathology  -calcium score 117, mainly in CFX (12/2023)  Normal stress EKG (1/5/24)  -s/p stent to 95% distal CFX (3/4/24)  -No CP overnight  -brilinta for at least 1 yr, ASA 81 mg indefinitely  -cardiac rehab  -Home today  Follow up with [facility] heart DURAN with BMP in 1-2 wks   with Dr Oliva in 3 months    Hypertension:  -home hydrochlorothiazide stopped   -BP well controlled on newly added Metoprolol and low dose Lisinopril    Hyperlipidemia:  -recently started on Atorvastatin  ( in December)  -LP(a)  is 12    Prediabetes:  -Hgb A1C 6.0  -discussed lifestyle modifications               Interval History:     Denies any chest pain, SOB, palpitations, dizziness  Family at bedside                Medications:      aspirin  81 mg Oral Daily    atorvastatin  40 mg Oral Daily    lisinopril  10 mg Oral Daily    metoprolol succinate ER  25 mg Oral Daily    pantoprazole  40 mg Oral QAM AC    ticagrelor  90 mg Oral Q12H            Physical Exam:   Blood pressure 115/78, pulse 82, temperature 97.6  F (36.4  C), temperature source Oral, resp. rate 16, height 1.676 m (5' 6\"), weight 80.2 kg (176 lb 11.2 oz), SpO2 97%.  Wt Readings from Last 3 Encounters:   03/02/24 80.2 kg (176 lb 11.2 oz)   05/03/15 75.6 kg (166 lb 11.2 oz)     No intake/output data recorded.    CONST:  alert and oriented  NAD  LUNGS:  CTA bilaterally  CARDIO:  RRR S1, S2  No murmurs  ABD:  soft    EXT:  no edema  2+ DP bilaterally  Right radial puncture site without bleeding  soft hematoma noted  Good " "pulses           Data:   TELE:  SR    CBC  Recent Labs   Lab 03/04/24  0359 03/02/24  1647   WBC 9.8 9.5   HGB 14.1 15.0    295       BMP  Recent Labs   Lab 03/05/24  0632 03/04/24  0359 03/02/24  1647    136 135   POTASSIUM 4.5 3.8 3.9   CHLORIDE 103 99 95*   ASHLEY 9.1 9.4 9.8   CO2 24 27 30*   BUN 12.1 11.9 11.6   CR 0.82 0.85 0.83   GLC 98 119* 129*     No results for input(s): \"CHOL\", \"HDL\", \"LDL\", \"TRIG\", \"CHOLHDLRATIO\" in the last 34496 hours.    TROP  No results found for: \"TROPI\", \"TROPONIN\", \"TROPR\"    BNP  No results for input(s): \"NTBNPI\", \"NTBNP\" in the last 168 hours.         "

## 2024-03-05 NOTE — PROGRESS NOTES
03/05/24 0852   Appointment Info   Signing Clinician's Name / Credentials (PT) Lilibeth Sauceda DPT   Living Environment   People in Home spouse   Current Living Arrangements house   Living Environment Comments Patient reports living in 2 level home   Self-Care   Usual Activity Tolerance good   Current Activity Tolerance moderate   Equipment Currently Used at Home none   Fall history within last six months no   Activity/Exercise/Self-Care Comment Patient independent with all mobility prior to admission.  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   General Information   Onset of Illness/Injury or Date of Surgery 03/03/24   Referring Physician Lev Basilio MD   Patient/Family Therapy Goals Statement (PT) return to home   Pertinent History of Current Problem (include personal factors and/or comorbidities that impact the POC) Huyen Hickman is a 48 year old male with PMHx significant for HTN, HLD, GERD, prediabetes and elevated coronary calcium, who was admitted on 3/2/2024 for radiating chest pain and elevated troponin, found to have a NSTEMI now POD#1 s/p card cath with PCI   Cognition   Orientation Status (Cognition) oriented x 4   Pain Assessment   Patient Currently in Pain No   Integumentary/Edema   Integumentary/Edema Comments Right UE still on arm board   Posture    Posture Forward head position;Protracted shoulders   Range of Motion (ROM)   Range of Motion ROM is WFL   Strength (Manual Muscle Testing)   Strength (Manual Muscle Testing) strength is WFL   Bed Mobility   Comment, (Bed Mobility) Independent   Transfers   Comment, (Transfers) Independent   Gait/Stairs (Locomotion)   [place] Level (Gait) independent   Comment, (Gait/Stairs) Patient ambulated independently in willis over 500 feet for 6 minutes   Balance   Balance Comments No history of falls, no LOB during session   Clinical Impression   Criteria for Skilled Therapeutic Intervention Yes, treatment indicated   PT Diagnosis (PT) Imapired  functional mobility   Influenced by the following impairments decreased activity tolerance   Functional limitations due to impairments difficulties with amb   Clinical Presentation (PT Evaluation Complexity) stable   Clinical Presentation Rationale clinical judgement   Clinical Decision Making (Complexity) low complexity   Planned Therapy Interventions (PT) home exercise program;patient/family education;progressive activity/exercise;risk factor education;home program guidelines   Risk & Benefits of therapy have been explained evaluation/treatment results reviewed;care plan/treatment goals reviewed;risks/benefits reviewed;current/potential barriers reviewed;participants voiced agreement with care plan;participants included;patient;spouse/significant other   PT Total Evaluation Time   PT Eval, Low Complexity Minutes (28615) 10   Physical Therapy Goals   PT Frequency One time eval and treatment only   PT Predicted Duration/Target Date for Goal Attainment 03/05/24   PT Goals Cardiac Phase 1   PT: Understanding of cardiac education to maximize quality of life, condition management, and health outcomes Patient;Verbalize;Goal Met   PT: Perform aerobic activity with stable cardiovascular response continuous;5 minutes;ambulation;Goal Met   PT: Functional/aerobic ambulation tolerance with stable cardiovascular response in order to return to home and community environment Independent;Greater than 300 feet;Goal Met   PT: Navigation of stairs simulating home set up with stable cardiovascular response in order to return to home and community environment Independent;Greater than 10 stairs;Rail on right;Goal Met   Interventions   Interventions Quick Adds Therapeutic Activity   Therapeutic Activity   Therapeutic Activities: dynamic activities to improve functional performance Minutes (80932) 23   Symptoms Noted During/After Treatment None   Treatment Detail/Skilled Intervention Completed cardiac education in cardiac risk factors,  signs and symptoms of exercise intolerance, advancement of activity following a MI and stent placement, benefits to aerobic exercise and outpatient cardiac rehab, Omni Exertion as well as progression of exercise program at home. Patient asked appropriate questions and verbalized understanding afterwards. Patient ambulated independently in willis over 500 feet for 6 minutes. Resting BP: 118/80, HR: 85. After 5 min walk: /81, HR: 84   PT Discharge Planning   PT Plan dc   PT Discharge Recommendation (DC Rec) home with outpatient cardiac rehab   PT Rationale for DC Rec Patient has met needed goals for safe return home with spouse. OP CR highly recommended for advancement of exercises, risk factor modification and support in lifestyle changes.   PT Brief overview of current status independent   Total Session Time   Timed Code Treatment Minutes 23   Total Session Time (sum of timed and untimed services) 33

## 2024-03-05 NOTE — PLAN OF CARE
Vitals are Temp: 98.5  F (36.9  C) Temp src: Oral BP: 101/58 Pulse: 76   Resp: 18 SpO2: 98 %.  Patient is Alert and Oriented x4. They are independent with no assistive devices .  Pt is a Cardiac diet.  They are complaining of 3/10 tenderness in their RUE.  Ice pack applied. Extremity elevated.  Patient is Saline locked. Denies nausea/dizziness/SOB. Arm board in place to  RUE - band aid over site. RA. Continuous pulse ox on. CMS intact. On tele, SR 70s. Cardiology following. Plan is to discharge home today.

## 2024-03-05 NOTE — PLAN OF CARE
"Dx: Chest pain    /78 (BP Location: Left arm)   Pulse 82   Temp 97.6  F (36.4  C) (Oral)   Resp 16   Ht 1.676 m (5' 6\")   Wt 80.2 kg (176 lb 11.2 oz)   SpO2 97%   BMI 28.52 kg/m       A&O x4. Independent. Low saturated Na/Fat diet tolerated. Mild swelling and no tenderness above TR band placement site. Reporting no pain at this time. Appropriate behavior. Bed alarm off and call light within reach.  "

## 2024-03-05 NOTE — PLAN OF CARE
A&Ox4, SBA due to having the TR band in place, tolerating diet, currently on tele, reported chest discomfort at times with movement to Dr. Basilio when he rounded at the bedside to check angio site, TR band to be removed, CMS intact, pulse ox in place and border marked for hematoma elevation and ice applied, lungs- clear, bowels- active, voiding without difficulty, family at the bedside.

## 2024-03-05 NOTE — DISCHARGE SUMMARY
"[facility]  Hospitalist Discharge Summary      Date of Admission:  3/2/2024  Date of Discharge:  3/5/2024  Discharging Provider: Ellen Jim PA-C  Discharge Service: Hospitalist Service    Discharge Diagnoses   NSTEMI  Chest pain  HTN  Prediabetes     Clinically Significant Risk Factors     # Overweight: Estimated body mass index is 28.52 kg/m  as calculated from the following:    Height as of this encounter: 1.676 m (5' 6\").    Weight as of this encounter: 80.2 kg (176 lb 11.2 oz).       Follow-ups Needed After Discharge   Follow-up Appointments     Follow-up and recommended labs and tests       Follow up with primary care provider, [facility],   within 7 days for hospital follow- up.  No follow up labs or test are   needed.  Follow up with Cardiology as directed  Continue baby aspirin  Continue atorvastatin at current dose of 40 mg. Cardiology may want to   increase the dose in the future  Stop Lisinopril/hydrochlorothiazide and start Lisinopril alone at 10 mg  Start metoprolol 25 mg daily  Start Brilinta twice daily for 1 year after stent placement            Unresulted Labs Ordered in the Past 30 Days of this Admission       Date and Time Order Name Status Description    3/4/2024  1:54 PM Lipid Profile In process         These results will be followed up by PCP    Discharge Disposition   Discharged to home  Condition at discharge: Stable    Hospital Course   Huyen Hickman is a 48 year old male with PMHx significant for HTN, HLD, GERD, prediabetes and elevated coronary calcium, who was admitted on 3/2/2024 for radiating chest pain and elevated troponin, found to have a NSTEMI.    He has a strong family history of cardiac disease, premature CAD in the last 3 generations, almost every male in his life  in their mid 50s.  The patient's brother had a MI recently requiring PCI at age 49.     1. NSTEMI  Unstable angina  Presented with chest pain that " radiated to arms and jaw. Likely ischemic with significant past family history of CAD and early cardiac death. Also had CT Coronary Ca score done in December that was elevated.    EKG changes per report from urgent care-- ST changes in the inferior leads.  Flipped T-wave in lead III and aVF.    Given family hx and elevated Ca score, has been following with a preventative Cardiologist in the  system, started on statin in Dec  - troponin trended, rising; 28-->35-->47-->55-->69.   - Heparin gtt started 3/3 given rising troponin  - Cardiology consulted.    - Echo done 3/3, no wall motion abnormalities, EF 60-65%.   - Monitored on tele here, no arrhythmias noted  - Lipoprotein(a) 12   - Lipid panel; total cholesterol 121, LDL 60, HDL 32  - Angiogram done 3/4/24, 95% stenosed distal circumflex lesion, s/p PCI and stent  - Continue baby ASA, add Brilinta BID for 1 yr  - Continue atorvastatin 40 mg daily.   - discontinued hydrochlorothiazide.    - Continue lisinopril, dose lowered to 10 mg  - Metoprolol tartrate 12.5 mg BID started 3/4, increased to 25 mg BID   - given nitroglycerin SL on discharge  - follow up with Cardiology as directed.   - referral sent for Cardiac Rehab    2. HTN  BPs stable here.    - Discontinued hydrochlorothiazide to make room for metoprolol.    - Continued lisinopril, dose reduced to 10 mg   - started on metoprolol XL, increased to 25 mg daily    3. Metabolic syndrome // Prediabetic  HgbA1c 6.0 on 3/2. Repeat lipid panel showed significant improvement from Dec, total cholesterol 121, LDL 60, HDL 32. Previously, total cholesterol 246, , HDL 31  - Continue atorvastatin 40 mg.   4. GERD  Previous history of GERD.  Uses TUMS or PPI PRN.    - Given PPI here while on heparin/ASA  - consider daily PPI use if GERD sx increase with Brilinta and ASA    Consultations This Hospital Stay   CARDIOLOGY IP CONSULT  PHARMACY IP CONSULT  CARDIOLOGY IP CONSULT  HOSPITALIST IP CONSULT  NUTRITION SERVICES  ADULT IP CONSULT  CARDIAC REHAB IP CONSULT  PHARMACY IP CONSULT  SMOKING CESSATION PROGRAM IP CONSULT    Code Status   Full Code    Time Spent on this Encounter   I, Ellen Jim PA-C, personally saw the patient today and spent greater than 30 minutes discharging this patient.       Ellen Jim PA-C  [facility] OBSERVATION DEPT  [address] [place] [address]  Phone: [phone]  ______________________________________________________________________    Physical Exam   Vital Signs: Temp: 97.6  F (36.4  C) Temp src: Oral BP: 115/78 Pulse: 82   Resp: 16 SpO2: 97 % O2 Device: None (Room air) Oxygen Delivery: 3 LPM  Weight: 176 lbs 11.2 oz    GENERAL:  Comfortable.  PSYCH: pleasant, oriented, No acute distress.  HEART:  RRR  LUNGS:  Normal Respiratory effort.   EXTREMITIES:  able to ambulate independently  SKIN:  Dry to touch, No rash, wound or ulcerations.  NEUROLOGIC:  grossly intact       Primary Care Physician   [facility]    Discharge Orders      Basic metabolic panel     Lipid Profile     ALT     Cardiac Rehab  Referral      Follow-Up with Cardiology DURAN      Follow-Up with Cardiology      Cardiac Rehab  Referral      Medication Instructions - Anticoagulants    Do NOT stop your aspirin or platelet inhibitor unless directed by your Cardiologist.  These medications help to prevent platelets in your blood from sticking together and forming a clot.  Examples of these medications are:  Ticagrelor (Brilinta), Clopidigrel (Plavix), Prasugrel (Effient)     When to call - Contact the Heart Clinic    You may experience symptoms that require follow-up before your scheduled appointment. Contact the Heart Clinic if you develop: Fever over 100.4o Fahrenheit, that lasts more than one day; Redness, heat, or pus at the puncture site; Change in color or temperature in your hand or arm.     When to call - Reasons to Call 911    If your wrist puncture  site starts bleeding after discharge, sit down and apply firm pressure with your thumb against the puncture site and fingers against the back of the wrist for 10 minutes. If the bleeding stops, continue to rest, keeping your wrist still for 2 hours. Notify your doctor as soon as possible.  IF BLEEDING DOES NOT STOP OR THERE IS A LARGER AMOUNT OF BLEEDING OR SPURTING CALL 9-1-1 immediately.DO NOT drive yourself to the hospital.     Precautions - Lifting    DO NOT lift more than 5 pounds with affected arm for 48 hours     Precautions - Household Activities    Avoid excessive bending or movement of your wrist for 72 hours.  Do not subject hand/arm to any forceful movements for 24 hours, such as supporting weight when rising from a chair or bed.     Remove the band-aid on the puncture site after 24 hours and leave open to air. If minor oozing, you may apply a band-aid and remove after 12 hours.     Precautions - Active Sports Activities    DO NOT engage in vigorous exercise using your affected arm for 3 days after discharge.  This includes golf, tennis or swimming.     Precautions - Operating yard equipment or vehicles    Do not operate a chainsaw, lawnmower, motorcycle, or all-terrain vehicle for 48 hours after the procedure.     Precautions - Elective Dental Work    NO elective dental work for 6 weeks after receiving a stent.     Comfort and Pain Management - Bruising after Surgery    Expect mild tingling of hand and tenderness at the wrist puncture site for up to 3 days. You may take Tylenol or a pain medicine recommended by your doctor.     Activity - Cardiac Rehab    You are encouraged to enroll in an Outpatient Cardiac Rehab program after discharge from the hospital.  Our Cardiac Rehab staff may visit briefly with you while you're in the hospital.  If they miss you, someone will contact you after you are home.     Return to Driving    Driving is NOT permitted for 24 hours after surgery     Return to work    You  may return to work after 72 hours if you are feeling well and your job does not involve heavy lifting.     Shower / Bathing    You may shower on the day after your procedure.  DO NOT soak of wrist with the puncture site in water for 3 days to prevent infection. DO NOT take a tub bath or wash dishes for 3 days after the procedure     Dressing Removal    Remove the band-aid on the puncture site after 24 hours and leave open to air. If minor oozing, you may apply a band-aid and remove after 12 hours     Reason for your hospital stay    NSTEMI. Cardiology was consulted and you underwent a coronary angiogram and had one stent placed to your distal circumflex artery. Several adjustments were made to medications.     When to contact your care team    Call your primary doctor if you have any of the following: temperature greater than 101, increased shortness of breath, or increased chest pain.     Activity    Your activity upon discharge: activity as tolerated, per cardiology restrictions     Follow-up and recommended labs and tests     Follow up with primary care provider, [facility], within 7 days for hospital follow- up.  No follow up labs or test are needed.  Follow up with Cardiology as directed  Continue baby aspirin  Continue atorvastatin at current dose of 40 mg. Cardiology may want to increase the dose in the future  Stop Lisinopril/hydrochlorothiazide and start Lisinopril alone at 10 mg  Start metoprolol 25 mg daily  Start Brilinta twice daily for 1 year after stent placement  Consider daily PPI use if GERD sx increase.     Diet    Follow this diet upon discharge: Regular       Significant Results and Procedures   Most Recent 3 CBC's:  Recent Labs   Lab Test 03/04/24  0359 03/02/24  1647   WBC 9.8 9.5   HGB 14.1 15.0   MCV 89 89    295     Most Recent 3 BMP's:  Recent Labs   Lab Test 03/05/24  0632 03/04/24  0359 03/02/24  1647    136 135   POTASSIUM 4.5 3.8 3.9   CHLORIDE 103 99  95*   CO2 24 27 30*   BUN 12.1 11.9 11.6   CR 0.82 0.85 0.83   ANIONGAP 9 10 10   ASHLEY 9.1 9.4 9.8   GLC 98 119* 129*     Most Recent 2 LFT's:  Recent Labs   Lab Test 24  1647   AST 46*   ALT 83*   ALKPHOS 111   BILITOTAL 0.5     Most Recent Cholesterol Panel:  Recent Labs   Lab Test 24  0917   CHOL 121   LDL 60   HDL 32*   TRIG 145     Most Recent TSH and T4:  Recent Labs   Lab Test 24  0748   TSH 2.10     Most Recent Hemoglobin A1c:  Recent Labs   Lab Test 24  1647   A1C 6.0*     Most Recent 6 glucoses:  Recent Labs   Lab Test 24  0632 24  0359 24  1647   GLC 98 119* 129*       Results for orders placed or performed during the hospital encounter of 24   Chest XR,  PA & LAT    Narrative    EXAM: XR CHEST 2 VIEWS  LOCATION: [facility]  DATE: 3/2/2024    INDICATION: chest pain  COMPARISON: None.      Impression    IMPRESSION: Negative chest.   Echocardiogram Complete     Value    LVEF  60-65%    Narrative    [id number]  [id number]  [id number]  [id number]^[email or web address]^YOSVANY^ARIELLE     [facility]  Echocardiography Laboratory  [address] [place] [address]     Name: BOSSMAN VALVERDE  MRN: [id number]  : 1975  Study Date: 2024 02:02 PM  Age: 48 yrs  Gender: Male  Patient Location: [email or web address]  Reason For Study: MI - Subendocardial  Ordering Physician: YOSVANY CASIANO  Referring Physician: Venita [facility]  Performed By: Shima Acevedo     BSA: 1.9 m2  Height: 66 in  Weight: 176 lb  HR: 95  BP: 118/82 mmHg  ______________________________________________________________________________  Procedure  Complete Portable Echo Adult. Optison (NDC #[id number]) given intravenously.  Technically difficult study.  ______________________________________________________________________________  Interpretation Summary     The visual ejection fraction is 60-65%.  Left ventricular systolic function is normal.  The study was  technically difficult. The study was technically limited.  ______________________________________________________________________________  Left Ventricle  The left ventricle is normal in size. There is normal left ventricular wall  thickness. Diastolic Doppler findings (E/E' ratio and/or other parameters)  suggest left ventricular filling pressures are normal. The visual ejection  fraction is 60-65%. Left ventricular systolic function is normal.     Right Ventricle  The right ventricle is normal in size and function.     Atria  Normal left atrial size. Right atrial size is normal. There is no color  Doppler evidence of an atrial shunt.     Mitral Valve  There is trace mitral regurgitation.     Tricuspid Valve  There is trace tricuspid regurgitation. Right ventricular systolic pressure  could not be approximated due to inadequate tricuspid regurgitation.     Aortic Valve  The aortic valve is not well visualized. No aortic regurgitation is present.  No hemodynamically significant valvular aortic stenosis.     Pulmonic Valve  There is no pulmonic valvular regurgitation.     Vessels  The aortic root is normal size. Normal size ascending aorta. IVC diameter <2.1  cm collapsing >50% with sniff suggests a normal RA pressure of 3 mmHg.     Pericardium  There is no pericardial effusion.     Rhythm  Sinus rhythm was noted.  ______________________________________________________________________________  MMode/2D Measurements & Calculations  IVSd: 0.40 cm     LVIDd: 5.2 cm  LVIDs: 3.9 cm  LVPWd: 0.53 cm  FS: 24.1 %  LV mass(C)d: 74.9 grams  LV mass(C)dI: 39.6 grams/m2  Ao root diam: 3.3 cm  asc Aorta Diam: 3.1 cm  LVOT diam: 2.1 cm  LVOT area: 3.4 cm2  Ao root diam index Ht(cm/m): 2.0  Ao root diam index BSA (cm/m2): 1.8  Asc Ao diam index BSA (cm/m2): 1.7  Asc Ao diam index Ht(cm/m): 1.9  RWT: 0.21  TAPSE: 2.5 cm     Doppler Measurements & Calculations  MV E max angela: 49.3 cm/sec  MV A max angela: 59.2 cm/sec  MV E/A: 0.83  MV max  P.4 mmHg  MV mean P.84 mmHg  MV V2 VTI: 14.6 cm  MVA(VTI): 4.1 cm2     MV dec slope: 266.5 cm/sec2  MV dec time: 0.18 sec  Ao V2 max: 129.0 cm/sec  Ao max P.0 mmHg  Ao V2 mean: 86.9 cm/sec  Ao mean P.0 mmHg  Ao V2 VTI: 19.0 cm  GLENDA(I,D): 3.1 cm2  GLENDA(V,D): 3.1 cm2  LV V1 max P.6 mmHg  LV V1 max: 118.0 cm/sec  LV V1 VTI: 17.3 cm  SV(LVOT): 59.3 ml  SI(LVOT): 31.3 ml/m2  AV Guillermo Ratio (DI): 0.91  GLENDA Index (cm2/m2): 1.6  E/E' av.0  Lateral E/e': 3.3  Medial E/e': 4.6  RV S Guillermo: 18.1 cm/sec     ______________________________________________________________________________  Report approved by: Kartik Preston 2024 03:04 PM         Cardiac Catheterization    Narrative      Dist Cx lesion is 95% stenosed.    Pressure wire/FFR was not performed on the lesion.    Coronary artery disease: Presentation with unstable angina culminating in   non-ST segment elevation MI in setting of hypertension dyslipidemia and a   markedly positive family history premature coronary disease    Findings preintervention    Left main: Normal  LAD: Mild atheromatous change without focal narrowing  Right coronary: Dominant.  Moderate size with atheromatous change with no   focal narrowing  Circumflex: Focal 95% lesion after second marginal branch    We placed a 2.75 x 16 mm length everolimus eluting Synergy XD stent,   postdilated to 3.0 mm    Postintervention    Circumflex: No residual narrowing.  JC-3 flow.  No loss of sidebranch.         Discharge Medications   Current Discharge Medication List        START taking these medications    Details   lisinopril (ZESTRIL) 10 MG tablet Take 1 tablet (10 mg) by mouth daily  Qty: 30 tablet, Refills: 0    Associated Diagnoses: NSTEMI (non-ST elevated myocardial infarction) (H)      metoprolol succinate ER (TOPROL XL) 25 MG 24 hr tablet Take 1 tablet (25 mg) by mouth daily  Qty: 30 tablet, Refills: 0    Associated Diagnoses: NSTEMI (non-ST elevated myocardial  infarction) (H)      nitroGLYcerin (NITROSTAT) 0.4 MG sublingual tablet For chest pain place 1 tablet under the tongue every 5 minutes for 3 doses. If symptoms persist 5 minutes after 1st dose call 911.  Qty: 24 tablet, Refills: 1    Associated Diagnoses: NSTEMI (non-ST elevated myocardial infarction) (H)      ticagrelor (BRILINTA) 90 MG tablet Take 1 tablet (90 mg) by mouth every 12 hours for 30 days  Qty: 60 tablet, Refills: 0    Associated Diagnoses: NSTEMI (non-ST elevated myocardial infarction) (H)           CONTINUE these medications which have NOT CHANGED    Details   aspirin 81 MG EC tablet Take 81 mg by mouth daily      atorvastatin (LIPITOR) 40 MG tablet Take 40 mg by mouth daily           STOP taking these medications       lisinopril-hydrochlorothiazide (ZESTORETIC) 20-25 MG tablet Comments:   Reason for Stopping:             Allergies   No Known Allergies

## 2024-03-05 NOTE — PLAN OF CARE
Cardiac Rehab Discharge Summary    Reason for therapy discharge:    All goals and outcomes met, no further needs identified.    Progress towards therapy goal(s). See goals on Care Plan in Epic electronic health record for goal details.  Goals met    Therapy recommendation(s):    Patient has met needed goals for safe return home with spouse. OP CR highly recommended for advancement of exercises, risk factor modification and support in lifestyle changes.       Huyen Hikcman is a 48 year old male with PMHx significant for HTN, HLD, GERD, prediabetes and elevated coronary calcium, who was admitted on 3/2/2024 for radiating chest pain and elevated troponin, found to have a NSTEMI now POD#1 s/p card cath with PCI

## 2024-03-06 LAB
ATRIAL RATE - MUSE: 75 BPM
DIASTOLIC BLOOD PRESSURE - MUSE: NORMAL MMHG
INTERPRETATION ECG - MUSE: NORMAL
P AXIS - MUSE: 56 DEGREES
PR INTERVAL - MUSE: 168 MS
QRS DURATION - MUSE: 92 MS
QT - MUSE: 366 MS
QTC - MUSE: 408 MS
R AXIS - MUSE: 14 DEGREES
SYSTOLIC BLOOD PRESSURE - MUSE: NORMAL MMHG
T AXIS - MUSE: 0 DEGREES
VENTRICULAR RATE- MUSE: 75 BPM

## 2024-03-07 ENCOUNTER — TELEPHONE (OUTPATIENT)
Dept: CARDIOLOGY | Facility: CLINIC | Age: 49
End: 2024-03-07
Payer: COMMERCIAL

## 2024-03-07 NOTE — TELEPHONE ENCOUNTER
Patient was admitted to [facility] on 3/2/24 with unstable angina culminating in non-ST segment elevation MI.    PMH: HTN, HLD, elevated calcium score in CFx of 117, markedly positive family history premature coronary disease in the last 3 generations, almost every male in his life  in their mid 50's. The patient's brother had a MI recently requiring PCI at age 49.      3/3/24: Echo showed EF of 60-65% with normal wall motion, normal RV function, no significant valvular pathology.    3/4/24: Coronary angiogram via RRA resulted in:      Dist Cx lesion is 95% stenosed.    Pressure wire/FFR was not performed on the lesion.     Left main: Normal  LAD: Mild atheromatous change without focal narrowing  Right coronary: Dominant.  Moderate size with atheromatous change with no focal narrowing  Circumflex: Focal 95% lesion after second marginal branch     We placed a 2.75 x 16 mm length everolimus eluting Synergy XD stent, post dilated to 3.0 mm    Pt was started on Lisinopril, NTG, Brilinta and Metoprolol. PTA ASA continued. Lipitor dosage increased. Zestoretic was discontinued at time of discharge.    Called patient to discuss any post hospital d/c questions, review medication changes, and confirm f/u appts. Patient denied any questions regarding new medications or changes to PTA medications.     RN confirmed with patient that he was d/c with an adequate supply of the antiplatelet Brilinta, and reminded of importance of taking without interruption.     Pt has an Rx for PRN SL Nitroglycerin.     Patient denied any SOB, chest pain or light headedness. States for 1.5 days post discharge, pt was experiencing some left anterior chest pressure, rated as 2-3/10, which pt states Dr. Basilio said he may feel. Different than his anginal equilvalent. Denies chest pain now. Pt also reports that he has to draw in a deeper breath at times. Denies chest pain, edema or wheezing (has hx of asthma). Explained to pt this can be a common side  effect of Brilinta and will continue to monitor.    RRA cardiac cath site is without bleeding, swelling, redness or signs of infection.     RN confirmed with patient that he needs to be scheduled for labs, cardiology WOODROW and cardiologist appts as ordered. Dr. Bernardino Acevedo's Team RN phone number provided. After hours cardiology phone number/scheduling phone number provided.    Cardiac rehab is scheduled on 3/19/24 at 1415 in [place].    Patient advised to call clinic with any cardiac related questions or concerns prior to this woodrow't. Patient verbalized understanding and agreed with plan. MIREYA Alvarez RN.

## 2024-03-19 ENCOUNTER — HOSPITAL ENCOUNTER (OUTPATIENT)
Dept: CARDIAC REHAB | Facility: CLINIC | Age: 49
Discharge: HOME | End: 2024-03-19
Attending: INTERNAL MEDICINE
Payer: COMMERCIAL

## 2024-03-19 DIAGNOSIS — I20.0 UNSTABLE ANGINA (H): ICD-10-CM

## 2024-03-19 DIAGNOSIS — I21.4 NSTEMI (NON-ST ELEVATED MYOCARDIAL INFARCTION) (H): ICD-10-CM

## 2024-03-19 PROCEDURE — 93797 PHYS/QHP OP CAR RHAB WO ECG: CPT | Mod: 59 | Performed by: REHABILITATION PRACTITIONER

## 2024-03-19 PROCEDURE — 93798 PHYS/QHP OP CAR RHAB W/ECG: CPT | Performed by: REHABILITATION PRACTITIONER

## 2024-03-28 ENCOUNTER — HOSPITAL ENCOUNTER (OUTPATIENT)
Dept: CARDIAC REHAB | Facility: CLINIC | Age: 49
Discharge: HOME | End: 2024-03-28
Attending: INTERNAL MEDICINE
Payer: COMMERCIAL

## 2024-03-28 PROCEDURE — 93798 PHYS/QHP OP CAR RHAB W/ECG: CPT

## 2024-04-03 ENCOUNTER — HOSPITAL ENCOUNTER (OUTPATIENT)
Dept: CARDIAC REHAB | Facility: CLINIC | Age: 49
Discharge: HOME | End: 2024-04-03
Attending: INTERNAL MEDICINE
Payer: COMMERCIAL

## 2024-04-03 PROCEDURE — 93798 PHYS/QHP OP CAR RHAB W/ECG: CPT

## 2024-04-12 ENCOUNTER — HOSPITAL ENCOUNTER (OUTPATIENT)
Dept: CARDIAC REHAB | Facility: CLINIC | Age: 49
Discharge: HOME | End: 2024-04-12
Attending: INTERNAL MEDICINE
Payer: COMMERCIAL

## 2024-04-12 PROCEDURE — 93798 PHYS/QHP OP CAR RHAB W/ECG: CPT

## 2024-04-17 ENCOUNTER — HOSPITAL ENCOUNTER (OUTPATIENT)
Dept: CARDIAC REHAB | Facility: CLINIC | Age: 49
Discharge: HOME | End: 2024-04-17
Attending: INTERNAL MEDICINE
Payer: COMMERCIAL

## 2024-04-17 PROCEDURE — 93798 PHYS/QHP OP CAR RHAB W/ECG: CPT

## 2024-04-19 ENCOUNTER — HOSPITAL ENCOUNTER (OUTPATIENT)
Dept: CARDIAC REHAB | Facility: CLINIC | Age: 49
Discharge: HOME | End: 2024-04-19
Attending: INTERNAL MEDICINE
Payer: COMMERCIAL

## 2024-04-19 PROCEDURE — 93798 PHYS/QHP OP CAR RHAB W/ECG: CPT | Performed by: REHABILITATION PRACTITIONER

## 2024-04-24 ENCOUNTER — HOSPITAL ENCOUNTER (OUTPATIENT)
Dept: CARDIAC REHAB | Facility: CLINIC | Age: 49
Discharge: HOME | End: 2024-04-24
Attending: INTERNAL MEDICINE
Payer: COMMERCIAL

## 2024-04-24 PROCEDURE — 93798 PHYS/QHP OP CAR RHAB W/ECG: CPT

## 2024-04-24 PROCEDURE — 93797 PHYS/QHP OP CAR RHAB WO ECG: CPT

## 2024-04-24 NOTE — PROGRESS NOTES
NUTRITION ASSESSMENT & EDUCATION NOTE     REASON FOR ASSESSMENT  Huyen Hickman is a 48 year old male seen by Registered Dietitian for 1:1 Cardiac Rehab Nutrition 1:1 consult    NUTRITION HISTORY  - Information obtained from patient  - Patient has been instructed to follow a heart healthy, 2g sodium, low saturated fat, Mediterranean diet.  - Previous diet education: none  - Patient has attended no nutrition classes offered by cardiac rehab  - Nutrition-related goals: avoid salt, choose lean proteins, lower cholesterol, avoid butter.   - Frequency of eating out: Frequent, pt eats out for lunch often     Typical intake as follows:   Pt normal po intakes consists of 3 meals and 0-2 snacks/day. Pt drinks 1- 12 oz cup of coffee with half and half and syrup with variable po intakes at breakfast. Pt notes having x2 eggs and 1 piece of toast this AM. Pt eats out at lunch frequently- , [place], prepared food at grocery store or leftovers. Po at dinner is variable, eats about 2-3 servings of protein with a carbohydrate, typically rice. Pt has been incorporating more vegetables into daily po intakes. Pt very rarely drinks alcohol, has 1/2 serving an estimated 2x per month. Pt eats fish frequently at baseline, notes preferences for beef high in saturated fat.      - Changes to diet since cardiac event: increased vegetable intakes- eats a salad daily now, decreased protein foods high in saturated fat, decreased fat added to coffee in AM, moderate carbohydrate intakes.    - Barriers to dietary changes: willingness to change diet habits, food preferences      NUTRITION DIAGNOSIS  Food- and nutrition- related knowledge deficit related to CAD as evidenced by need for heart healthy diet education.      INTERVENTIONS  Nutrition Prescription:  Cardiac diet:   Low saturated fat, Na <2400 mg  Fiber >25 g per day  Emphasis on plate method for balanced meals  Implementation:   Assessed learning needs and learning  preference.   Nutrition Education (Content):  a) Provided handouts:    Heart Healthy Consistent Carbohydrate Nutrition Therapy, Heart Healthy Nutrition Label Reading, Sodium Free Seasoning Tips, LDL Cholesterol-Lowering Nutrition Therapy, Consistent Carbohydrate Nutrition Therapy.   b) Discussed heart healthy diet recommendations, including label reading, minimizing added salts/saturated fats/sugars, balanced meals TID, at least two food groups per snack, heart healthy substitutes, eliminating all trans fat, sugar free beverages, nutrition recommendations for cho intakes at meals    Nutrition Education (Application):  a) Discussed eating habits and recommended alternative food choices:   a. Emphasized fruits, vegetables, low sodium nuts/heart healthy fats, fish, low fat dairy   b. Encouraged water and non sugar sweetened beverages    c. Discussed cooking more from scratch to monitor meal ingredients, portions, menu choices      Goals/Follow up/Monitoring For Cardiac Rehab Staff  - Adherence to nutrition related recommendations, follow with cardiac rehab  - Increase oral intakes of vegetables, use canola or olive oil instead of butter, consume </= 7 egg yolks/week, choose low or nonfat dairy products.   - Additional questions/concerns related to heart healthy guidelines       Tracey Salvador RD, LD  [facility] Cardiac and Pulmonary Rehab  Office: [phone]     DIRECT PATIENT CARE TIME: 30 MINUTES

## 2024-05-01 ENCOUNTER — HOSPITAL ENCOUNTER (OUTPATIENT)
Dept: CARDIAC REHAB | Facility: CLINIC | Age: 49
Discharge: HOME | End: 2024-05-01
Attending: INTERNAL MEDICINE
Payer: COMMERCIAL

## 2024-05-01 PROCEDURE — 93798 PHYS/QHP OP CAR RHAB W/ECG: CPT | Performed by: REHABILITATION PRACTITIONER

## 2024-05-03 ENCOUNTER — HOSPITAL ENCOUNTER (OUTPATIENT)
Dept: CARDIAC REHAB | Facility: CLINIC | Age: 49
Discharge: HOME | End: 2024-05-03
Attending: INTERNAL MEDICINE
Payer: COMMERCIAL

## 2024-05-03 PROCEDURE — 93798 PHYS/QHP OP CAR RHAB W/ECG: CPT

## 2024-05-08 ENCOUNTER — HOSPITAL ENCOUNTER (OUTPATIENT)
Dept: CARDIAC REHAB | Facility: CLINIC | Age: 49
Discharge: HOME | End: 2024-05-08
Attending: INTERNAL MEDICINE
Payer: COMMERCIAL

## 2024-05-08 PROCEDURE — 93798 PHYS/QHP OP CAR RHAB W/ECG: CPT

## 2024-05-15 ENCOUNTER — HOSPITAL ENCOUNTER (OUTPATIENT)
Dept: CARDIAC REHAB | Facility: CLINIC | Age: 49
Discharge: HOME | End: 2024-05-15
Attending: INTERNAL MEDICINE
Payer: COMMERCIAL

## 2024-05-15 PROCEDURE — 93798 PHYS/QHP OP CAR RHAB W/ECG: CPT

## 2024-05-17 ENCOUNTER — HOSPITAL ENCOUNTER (OUTPATIENT)
Dept: CARDIAC REHAB | Facility: CLINIC | Age: 49
Discharge: HOME | End: 2024-05-17
Attending: INTERNAL MEDICINE
Payer: COMMERCIAL

## 2024-05-17 PROCEDURE — 93798 PHYS/QHP OP CAR RHAB W/ECG: CPT

## 2024-05-22 ENCOUNTER — HOSPITAL ENCOUNTER (OUTPATIENT)
Dept: CARDIAC REHAB | Facility: CLINIC | Age: 49
Discharge: HOME | End: 2024-05-22
Attending: INTERNAL MEDICINE
Payer: COMMERCIAL

## 2024-05-22 PROCEDURE — 93798 PHYS/QHP OP CAR RHAB W/ECG: CPT

## 2024-05-29 ENCOUNTER — HOSPITAL ENCOUNTER (OUTPATIENT)
Dept: CARDIAC REHAB | Facility: CLINIC | Age: 49
Discharge: HOME | End: 2024-05-29
Attending: INTERNAL MEDICINE
Payer: COMMERCIAL

## 2024-05-29 PROCEDURE — 93798 PHYS/QHP OP CAR RHAB W/ECG: CPT | Performed by: REHABILITATION PRACTITIONER

## 2025-01-11 ENCOUNTER — HEALTH MAINTENANCE LETTER (OUTPATIENT)
Age: 50
End: 2025-01-11

## (undated) DEVICE — CATH DIAGNOSTIC RADIAL 5FR TIG 4.0

## (undated) DEVICE — GUIDEWIRE VASC 0.014INX180CM RUNTHROUGH 25-1011

## (undated) DEVICE — KIT LG BORE TOUHY ACCESS PLUS MAP152

## (undated) DEVICE — CATH LAUNCHER 6FR EBU 30 LA6EBU30

## (undated) DEVICE — MANIFOLD KIT ANGIO AUTOMATED 014613

## (undated) DEVICE — RAD INFLATOR BASIC COMPAK  IN4130

## (undated) DEVICE — DEFIB PRO-PADZ LVP LQD GEL ADULT 8900-2105-01

## (undated) DEVICE — CATH BALLOON EUPHORA RX 2.5X12MM EUP2512X

## (undated) DEVICE — WIRE GUIDE 0.035"X260CM SAFE-T-J EXCHANGE G00517

## (undated) DEVICE — KIT HAND CONTROL ANGIOTOUCH ACIST 65CM AT-P65

## (undated) DEVICE — SLEEVE TR BAND RADIAL COMPRESSION DEVICE 24CM TRB24-REG

## (undated) DEVICE — INTRO GLIDESHEATH SLENDER 6FR 10X45CM 60-1060

## (undated) DEVICE — CATH BALLOON NC EMERGE 3.00X12MM H7493926712300

## (undated) DEVICE — CATH JACKY 5FR 3.5 CURVE 40-5023

## (undated) DEVICE — CATH BALLOON NC EMERGE 3.00X6MM H7493926706300

## (undated) DEVICE — Device

## (undated) RX ORDER — VERAPAMIL HYDROCHLORIDE 2.5 MG/ML
INJECTION, SOLUTION INTRAVENOUS
Status: DISPENSED
Start: 2024-03-04

## (undated) RX ORDER — HEPARIN SODIUM 1000 [USP'U]/ML
INJECTION, SOLUTION INTRAVENOUS; SUBCUTANEOUS
Status: DISPENSED
Start: 2024-03-04

## (undated) RX ORDER — NITROGLYCERIN 5 MG/ML
VIAL (ML) INTRAVENOUS
Status: DISPENSED
Start: 2024-03-04

## (undated) RX ORDER — LIDOCAINE HYDROCHLORIDE 10 MG/ML
INJECTION, SOLUTION EPIDURAL; INFILTRATION; INTRACAUDAL; PERINEURAL
Status: DISPENSED
Start: 2024-03-04

## (undated) RX ORDER — FENTANYL CITRATE 50 UG/ML
INJECTION, SOLUTION INTRAMUSCULAR; INTRAVENOUS
Status: DISPENSED
Start: 2024-03-04